# Patient Record
Sex: FEMALE | Race: WHITE | NOT HISPANIC OR LATINO | Employment: FULL TIME | URBAN - METROPOLITAN AREA
[De-identification: names, ages, dates, MRNs, and addresses within clinical notes are randomized per-mention and may not be internally consistent; named-entity substitution may affect disease eponyms.]

---

## 2021-07-19 ENCOUNTER — LAB VISIT (OUTPATIENT)
Dept: LAB | Facility: HOSPITAL | Age: 26
End: 2021-07-19
Payer: MEDICAID

## 2021-07-19 ENCOUNTER — OFFICE VISIT (OUTPATIENT)
Dept: PRIMARY CARE CLINIC | Facility: CLINIC | Age: 26
End: 2021-07-19
Payer: MEDICAID

## 2021-07-19 VITALS
RESPIRATION RATE: 16 BRPM | HEIGHT: 61 IN | WEIGHT: 181.19 LBS | TEMPERATURE: 98 F | SYSTOLIC BLOOD PRESSURE: 122 MMHG | HEART RATE: 99 BPM | DIASTOLIC BLOOD PRESSURE: 74 MMHG | BODY MASS INDEX: 34.21 KG/M2 | OXYGEN SATURATION: 99 %

## 2021-07-19 DIAGNOSIS — Z11.4 SCREENING FOR HIV (HUMAN IMMUNODEFICIENCY VIRUS): ICD-10-CM

## 2021-07-19 DIAGNOSIS — E28.2 PCOS (POLYCYSTIC OVARIAN SYNDROME): Primary | ICD-10-CM

## 2021-07-19 DIAGNOSIS — Z11.59 ENCOUNTER FOR HEPATITIS C SCREENING TEST FOR LOW RISK PATIENT: ICD-10-CM

## 2021-07-19 DIAGNOSIS — Z12.4 SCREENING FOR CERVICAL CANCER: ICD-10-CM

## 2021-07-19 DIAGNOSIS — Z00.00 GENERAL MEDICAL EXAM: ICD-10-CM

## 2021-07-19 DIAGNOSIS — Z13.220 ENCOUNTER FOR LIPID SCREENING FOR CARDIOVASCULAR DISEASE: ICD-10-CM

## 2021-07-19 DIAGNOSIS — Z13.6 ENCOUNTER FOR LIPID SCREENING FOR CARDIOVASCULAR DISEASE: ICD-10-CM

## 2021-07-19 DIAGNOSIS — N92.6 ABNORMAL MENSES: ICD-10-CM

## 2021-07-19 PROCEDURE — 84439 ASSAY OF FREE THYROXINE: CPT | Performed by: NURSE PRACTITIONER

## 2021-07-19 PROCEDURE — 87389 HIV-1 AG W/HIV-1&-2 AB AG IA: CPT | Performed by: NURSE PRACTITIONER

## 2021-07-19 PROCEDURE — 99203 OFFICE O/P NEW LOW 30 MIN: CPT | Mod: S$PBB,,, | Performed by: NURSE PRACTITIONER

## 2021-07-19 PROCEDURE — 80061 LIPID PANEL: CPT | Performed by: NURSE PRACTITIONER

## 2021-07-19 PROCEDURE — 80053 COMPREHEN METABOLIC PANEL: CPT | Performed by: NURSE PRACTITIONER

## 2021-07-19 PROCEDURE — 86803 HEPATITIS C AB TEST: CPT | Performed by: NURSE PRACTITIONER

## 2021-07-19 PROCEDURE — 99203 PR OFFICE/OUTPT VISIT, NEW, LEVL III, 30-44 MIN: ICD-10-PCS | Mod: S$PBB,,, | Performed by: NURSE PRACTITIONER

## 2021-07-19 PROCEDURE — 99204 OFFICE O/P NEW MOD 45 MIN: CPT | Mod: PBBFAC,PN | Performed by: NURSE PRACTITIONER

## 2021-07-19 PROCEDURE — 99999 PR PBB SHADOW E&M-NEW PATIENT-LVL IV: ICD-10-PCS | Mod: PBBFAC,,, | Performed by: NURSE PRACTITIONER

## 2021-07-19 PROCEDURE — 83001 ASSAY OF GONADOTROPIN (FSH): CPT | Performed by: NURSE PRACTITIONER

## 2021-07-19 PROCEDURE — 84481 FREE ASSAY (FT-3): CPT | Performed by: NURSE PRACTITIONER

## 2021-07-19 PROCEDURE — 85025 COMPLETE CBC W/AUTO DIFF WBC: CPT | Performed by: NURSE PRACTITIONER

## 2021-07-19 PROCEDURE — 83002 ASSAY OF GONADOTROPIN (LH): CPT | Performed by: NURSE PRACTITIONER

## 2021-07-19 PROCEDURE — 99999 PR PBB SHADOW E&M-NEW PATIENT-LVL IV: CPT | Mod: PBBFAC,,, | Performed by: NURSE PRACTITIONER

## 2021-07-19 PROCEDURE — 84443 ASSAY THYROID STIM HORMONE: CPT | Performed by: NURSE PRACTITIONER

## 2021-07-19 PROCEDURE — 36415 COLL VENOUS BLD VENIPUNCTURE: CPT | Mod: PN | Performed by: NURSE PRACTITIONER

## 2021-07-19 PROCEDURE — 84146 ASSAY OF PROLACTIN: CPT | Performed by: NURSE PRACTITIONER

## 2021-07-20 ENCOUNTER — TELEPHONE (OUTPATIENT)
Dept: PRIMARY CARE CLINIC | Facility: CLINIC | Age: 26
End: 2021-07-20

## 2021-07-20 LAB
ALBUMIN SERPL BCP-MCNC: 4.2 G/DL (ref 3.5–5.2)
ALP SERPL-CCNC: 63 U/L (ref 55–135)
ALT SERPL W/O P-5'-P-CCNC: 53 U/L (ref 10–44)
ANION GAP SERPL CALC-SCNC: 10 MMOL/L (ref 8–16)
AST SERPL-CCNC: 46 U/L (ref 10–40)
BASOPHILS # BLD AUTO: 0.03 K/UL (ref 0–0.2)
BASOPHILS NFR BLD: 0.3 % (ref 0–1.9)
BILIRUB SERPL-MCNC: 0.5 MG/DL (ref 0.1–1)
BUN SERPL-MCNC: 12 MG/DL (ref 6–20)
CALCIUM SERPL-MCNC: 9.2 MG/DL (ref 8.7–10.5)
CHLORIDE SERPL-SCNC: 104 MMOL/L (ref 95–110)
CHOLEST SERPL-MCNC: 165 MG/DL (ref 120–199)
CHOLEST/HDLC SERPL: 3.2 {RATIO} (ref 2–5)
CO2 SERPL-SCNC: 23 MMOL/L (ref 23–29)
CREAT SERPL-MCNC: 0.9 MG/DL (ref 0.5–1.4)
DIFFERENTIAL METHOD: ABNORMAL
EOSINOPHIL # BLD AUTO: 0.2 K/UL (ref 0–0.5)
EOSINOPHIL NFR BLD: 1.6 % (ref 0–8)
ERYTHROCYTE [DISTWIDTH] IN BLOOD BY AUTOMATED COUNT: 12.6 % (ref 11.5–14.5)
EST. GFR  (AFRICAN AMERICAN): >60 ML/MIN/1.73 M^2
EST. GFR  (NON AFRICAN AMERICAN): >60 ML/MIN/1.73 M^2
FSH SERPL-ACNC: 4.7 MIU/ML
GLUCOSE SERPL-MCNC: 94 MG/DL (ref 70–110)
HCT VFR BLD AUTO: 44.9 % (ref 37–48.5)
HCV AB SERPL QL IA: NEGATIVE
HDLC SERPL-MCNC: 52 MG/DL (ref 40–75)
HDLC SERPL: 31.5 % (ref 20–50)
HGB BLD-MCNC: 14.5 G/DL (ref 12–16)
HIV 1+2 AB+HIV1 P24 AG SERPL QL IA: NEGATIVE
IMM GRANULOCYTES # BLD AUTO: 0.03 K/UL (ref 0–0.04)
IMM GRANULOCYTES NFR BLD AUTO: 0.3 % (ref 0–0.5)
LDLC SERPL CALC-MCNC: 98.4 MG/DL (ref 63–159)
LH SERPL-ACNC: 11.2 MIU/ML
LYMPHOCYTES # BLD AUTO: 1.2 K/UL (ref 1–4.8)
LYMPHOCYTES NFR BLD: 12.5 % (ref 18–48)
MCH RBC QN AUTO: 29.7 PG (ref 27–31)
MCHC RBC AUTO-ENTMCNC: 32.3 G/DL (ref 32–36)
MCV RBC AUTO: 92 FL (ref 82–98)
MONOCYTES # BLD AUTO: 0.4 K/UL (ref 0.3–1)
MONOCYTES NFR BLD: 4.5 % (ref 4–15)
NEUTROPHILS # BLD AUTO: 8 K/UL (ref 1.8–7.7)
NEUTROPHILS NFR BLD: 80.8 % (ref 38–73)
NONHDLC SERPL-MCNC: 113 MG/DL
NRBC BLD-RTO: 0 /100 WBC
PLATELET # BLD AUTO: 267 K/UL (ref 150–450)
PMV BLD AUTO: 11.6 FL (ref 9.2–12.9)
POTASSIUM SERPL-SCNC: 4.1 MMOL/L (ref 3.5–5.1)
PROLACTIN SERPL IA-MCNC: 8 NG/ML (ref 5.2–26.5)
PROT SERPL-MCNC: 7.7 G/DL (ref 6–8.4)
RBC # BLD AUTO: 4.89 M/UL (ref 4–5.4)
SODIUM SERPL-SCNC: 137 MMOL/L (ref 136–145)
T3FREE SERPL-MCNC: 3.1 PG/ML (ref 2.3–4.2)
T4 FREE SERPL-MCNC: 0.96 NG/DL (ref 0.71–1.51)
TRIGL SERPL-MCNC: 73 MG/DL (ref 30–150)
TSH SERPL DL<=0.005 MIU/L-ACNC: 1.11 UIU/ML (ref 0.4–4)
WBC # BLD AUTO: 9.87 K/UL (ref 3.9–12.7)

## 2021-12-02 ENCOUNTER — TELEPHONE (OUTPATIENT)
Dept: OBSTETRICS AND GYNECOLOGY | Facility: CLINIC | Age: 26
End: 2021-12-02
Payer: MEDICAID

## 2021-12-13 ENCOUNTER — OFFICE VISIT (OUTPATIENT)
Dept: OBSTETRICS AND GYNECOLOGY | Facility: CLINIC | Age: 26
End: 2021-12-13
Payer: MEDICAID

## 2021-12-13 VITALS
SYSTOLIC BLOOD PRESSURE: 124 MMHG | WEIGHT: 184.31 LBS | DIASTOLIC BLOOD PRESSURE: 66 MMHG | BODY MASS INDEX: 34.82 KG/M2

## 2021-12-13 DIAGNOSIS — Z87.42 HISTORY OF PCOS: Primary | ICD-10-CM

## 2021-12-13 DIAGNOSIS — E88.819 INSULIN RESISTANCE: ICD-10-CM

## 2021-12-13 PROCEDURE — 99999 PR PBB SHADOW E&M-EST. PATIENT-LVL III: CPT | Mod: PBBFAC,,, | Performed by: NURSE PRACTITIONER

## 2021-12-13 PROCEDURE — 99213 OFFICE O/P EST LOW 20 MIN: CPT | Mod: PBBFAC | Performed by: NURSE PRACTITIONER

## 2021-12-13 PROCEDURE — 99999 PR PBB SHADOW E&M-EST. PATIENT-LVL III: ICD-10-PCS | Mod: PBBFAC,,, | Performed by: NURSE PRACTITIONER

## 2021-12-13 PROCEDURE — 99204 PR OFFICE/OUTPT VISIT, NEW, LEVL IV, 45-59 MIN: ICD-10-PCS | Mod: S$PBB,,, | Performed by: NURSE PRACTITIONER

## 2021-12-13 PROCEDURE — 99204 OFFICE O/P NEW MOD 45 MIN: CPT | Mod: S$PBB,,, | Performed by: NURSE PRACTITIONER

## 2021-12-13 RX ORDER — METFORMIN HYDROCHLORIDE 500 MG/1
500 TABLET, EXTENDED RELEASE ORAL
COMMUNITY
Start: 2021-11-08 | End: 2024-01-03

## 2021-12-13 RX ORDER — METHYLPHENIDATE HYDROCHLORIDE 27 MG/1
TABLET ORAL
COMMUNITY
End: 2024-01-03

## 2021-12-20 ENCOUNTER — LAB VISIT (OUTPATIENT)
Dept: LAB | Facility: HOSPITAL | Age: 26
End: 2021-12-20
Attending: NURSE PRACTITIONER
Payer: MEDICAID

## 2021-12-20 DIAGNOSIS — E88.819 INSULIN RESISTANCE: ICD-10-CM

## 2021-12-20 PROCEDURE — 83036 HEMOGLOBIN GLYCOSYLATED A1C: CPT | Performed by: NURSE PRACTITIONER

## 2021-12-20 PROCEDURE — 83525 ASSAY OF INSULIN: CPT | Performed by: NURSE PRACTITIONER

## 2021-12-20 PROCEDURE — 36415 COLL VENOUS BLD VENIPUNCTURE: CPT | Performed by: NURSE PRACTITIONER

## 2021-12-21 LAB
ESTIMATED AVG GLUCOSE: 100 MG/DL (ref 68–131)
HBA1C MFR BLD: 5.1 % (ref 4–5.6)
INSULIN COLLECTION INTERVAL: NORMAL
INSULIN SERPL-ACNC: 5.2 UU/ML

## 2022-02-07 ENCOUNTER — TELEPHONE (OUTPATIENT)
Dept: SPORTS MEDICINE | Facility: CLINIC | Age: 27
End: 2022-02-07
Payer: MEDICAID

## 2022-03-14 ENCOUNTER — OFFICE VISIT (OUTPATIENT)
Dept: SPORTS MEDICINE | Facility: CLINIC | Age: 27
End: 2022-03-14
Payer: MEDICAID

## 2022-03-14 ENCOUNTER — HOSPITAL ENCOUNTER (OUTPATIENT)
Dept: RADIOLOGY | Facility: HOSPITAL | Age: 27
Discharge: HOME | End: 2022-03-14
Attending: STUDENT IN AN ORGANIZED HEALTH CARE EDUCATION/TRAINING PROGRAM
Payer: MEDICAID

## 2022-03-14 VITALS — WEIGHT: 177.5 LBS | BODY MASS INDEX: 32.66 KG/M2 | HEIGHT: 62 IN | RESPIRATION RATE: 20 BRPM

## 2022-03-14 DIAGNOSIS — M79.641 RIGHT HAND PAIN: Primary | ICD-10-CM

## 2022-03-14 DIAGNOSIS — G56.21 CUBITAL TUNNEL SYNDROME ON RIGHT: ICD-10-CM

## 2022-03-14 DIAGNOSIS — S62.356S CLOSED NONDISPLACED FRACTURE OF SHAFT OF FIFTH METACARPAL BONE OF RIGHT HAND, SEQUELA: Primary | ICD-10-CM

## 2022-03-14 DIAGNOSIS — M79.641 RIGHT HAND PAIN: ICD-10-CM

## 2022-03-14 PROCEDURE — 73130 X-RAY EXAM OF HAND: CPT | Mod: TC,RT

## 2022-03-14 PROCEDURE — 99203 OFFICE O/P NEW LOW 30 MIN: CPT | Mod: S$PBB,,, | Performed by: STUDENT IN AN ORGANIZED HEALTH CARE EDUCATION/TRAINING PROGRAM

## 2022-03-14 PROCEDURE — 3008F BODY MASS INDEX DOCD: CPT | Mod: CPTII,,, | Performed by: STUDENT IN AN ORGANIZED HEALTH CARE EDUCATION/TRAINING PROGRAM

## 2022-03-14 PROCEDURE — 3008F PR BODY MASS INDEX (BMI) DOCUMENTED: ICD-10-PCS | Mod: CPTII,,, | Performed by: STUDENT IN AN ORGANIZED HEALTH CARE EDUCATION/TRAINING PROGRAM

## 2022-03-14 PROCEDURE — 73130 X-RAY EXAM OF HAND: CPT | Mod: 26,RT,, | Performed by: RADIOLOGY

## 2022-03-14 PROCEDURE — 99213 OFFICE O/P EST LOW 20 MIN: CPT | Mod: PBBFAC | Performed by: STUDENT IN AN ORGANIZED HEALTH CARE EDUCATION/TRAINING PROGRAM

## 2022-03-14 PROCEDURE — 99203 PR OFFICE/OUTPT VISIT, NEW, LEVL III, 30-44 MIN: ICD-10-PCS | Mod: S$PBB,,, | Performed by: STUDENT IN AN ORGANIZED HEALTH CARE EDUCATION/TRAINING PROGRAM

## 2022-03-14 PROCEDURE — 1159F PR MEDICATION LIST DOCUMENTED IN MEDICAL RECORD: ICD-10-PCS | Mod: CPTII,,, | Performed by: STUDENT IN AN ORGANIZED HEALTH CARE EDUCATION/TRAINING PROGRAM

## 2022-03-14 PROCEDURE — 73130 XR HAND COMPLETE 3 VIEW RIGHT: ICD-10-PCS | Mod: 26,RT,, | Performed by: RADIOLOGY

## 2022-03-14 PROCEDURE — 1159F MED LIST DOCD IN RCRD: CPT | Mod: CPTII,,, | Performed by: STUDENT IN AN ORGANIZED HEALTH CARE EDUCATION/TRAINING PROGRAM

## 2022-03-14 PROCEDURE — 99999 PR PBB SHADOW E&M-EST. PATIENT-LVL III: CPT | Mod: PBBFAC,,, | Performed by: STUDENT IN AN ORGANIZED HEALTH CARE EDUCATION/TRAINING PROGRAM

## 2022-03-14 PROCEDURE — 99999 PR PBB SHADOW E&M-EST. PATIENT-LVL III: ICD-10-PCS | Mod: PBBFAC,,, | Performed by: STUDENT IN AN ORGANIZED HEALTH CARE EDUCATION/TRAINING PROGRAM

## 2022-03-14 NOTE — PROGRESS NOTES
Patient ID: Maninder Schneider  YOB: 1995  MRN: [id number]    Chief Complaint: No chief complaint on file.      Referred By: *** for ***    History of Present Illness: Maninder Schneider is a {left/right:181258}-hand dominant 27 y.o. female who presents today with ***.     The patient is active in {sports:10829}.  Occupation: ***    ***    Past Medical History:   No past medical history on file.  No past surgical history on file.  Family History   Problem Relation Age of Onset    No Known Problems Mother     No Known Problems Father      Social History     Socioeconomic History    Marital status: Single   Tobacco Use    Smoking status: Former Smoker    Smokeless tobacco: Current User    Tobacco comment: Vape   Substance and Sexual Activity    Alcohol use: Never    Drug use: Never    Sexual activity: Yes     Partners: Male     Medication List with Changes/Refills   Current Medications    METFORMIN (GLUCOPHAGE-XR) 500 MG ER 24HR TABLET    Take 500 mg by mouth.    METHYLPHENIDATE HCL 27 MG CR TABLET    Concerta Take No date recorded No form recorded No frequency recorded No route recorded No set duration recorded No set duration amount recorded active No dosage strength recorded No dosage strength units of measure recorded     Review of patient's allergies indicates:   Allergen Reactions    Tetanus vaccines and toxoid Rash and Swelling       Physical Exam:   There is no height or weight on file to calculate BMI.    GENERAL: Well appearing, in no acute distress.  HEAD: Normocephalic and atraumatic.  ENT: External ears and nose grossly normal.  EYES: EOMI bilaterally  PULMONARY: Respirations are grossly even and non-labored.  NEURO: Awake, alert, and oriented x 3.  SKIN: No obvious rashes appreciated.  PSYCH: Mood & affect are appropriate.    Detailed MSK exam:     ***    Imaging:    ***    Relevant imaging results were reviewed and interpreted by me and per my read ***.  This was discussed with the  patient and / or family today.     Assessment:  Maninder Schneider is a 27 y.o. female ***    Right hand pain  -     X-Ray Hand Complete Right; Future; Expected date: 03/14/2022         A copy of today's visit note has been sent to the referring provider.       Maximo Mcallister MD    Disclaimer: This note was prepared using a voice recognition system and is likely to have sound alike errors within the text.

## 2022-03-14 NOTE — PROGRESS NOTES
Patient ID: Maninder Schneider  YOB: 1995  MRN: [id number]    Chief Complaint: Pain, Injury, and Numbness of the Right Hand    Referred By: Self  for Right Hand Pain     History of Present Illness: Maninder Schneider is a right-hand dominant 27 y.o. female who presents today with 0/10 pain c/o Right Hand Pain .     The patient is active in none.  Occupation:      19-year-old female presenting today for persistent ulnar-sided right hand pain.  She had a injury when she was 15 years old about 12 years ago where she had a shaft fracture to the 5th metacarpal.  She was treated conservatively and immobilization for short time and recently had an injury playing with the kids were finger been always backwards.  She has had before this recent injury still pain and numbness and tingling and feeling of an itch in the webspace between her 4th and 5th digit.  She also notices that her distal phalanx will rub and feels like she has a little bit of rotation to her pinky finger at times when opening and closing making a fist.  She denies any constant numbness and tingling.  She occasionally has pain at the elbow as she has been told is lateral epicondylitis.  Sometimes pain at the wrist especially over the old fracture site mostly on the palmar side.    Past Medical History:   History reviewed. No pertinent past medical history.  History reviewed. No pertinent surgical history.  Family History   Problem Relation Age of Onset    No Known Problems Mother     No Known Problems Father      Social History     Socioeconomic History    Marital status: Single   Tobacco Use    Smoking status: Former Smoker    Smokeless tobacco: Current User    Tobacco comment: Vape   Substance and Sexual Activity    Alcohol use: Never    Drug use: Never    Sexual activity: Yes     Partners: Male     Medication List with Changes/Refills   Current Medications    METFORMIN (GLUCOPHAGE-XR) 500 MG ER 24HR TABLET    Take 500 mg by  mouth.    METHYLPHENIDATE HCL 27 MG CR TABLET    Concerta Take No date recorded No form recorded No frequency recorded No route recorded No set duration recorded No set duration amount recorded active No dosage strength recorded No dosage strength units of measure recorded     Review of patient's allergies indicates:   Allergen Reactions    Tetanus vaccines and toxoid Rash and Swelling       Physical Exam:   Body mass index is 32.99 kg/m².    GENERAL: Well appearing, in no acute distress.  HEAD: Normocephalic and atraumatic.  ENT: External ears and nose grossly normal.  EYES: EOMI bilaterally  PULMONARY: Respirations are grossly even and non-labored.  NEURO: Awake, alert, and oriented x 3.  SKIN: No obvious rashes appreciated.  PSYCH: Mood & affect are appropriate.    Detailed MSK exam:     Right hand exam  Subtle rotation appreciated pinky finger with opening closing right hand.  Motor function median ulnar radial nerve all intact sensation intact throughout tenderness on the palmar side of the shaft of the 5th metacarpal loss of MCP on the pinky finger of the right hand as well.  Mild tenderness over lateral epicondyle the elbow mild tenderness over the medial epicondyle E elbow equivocal Tinel's in the cubital tunnel negative Tinel's at Guyon's canal    Imaging:    Narrative & Impression  EXAMINATION:  XR HAND COMPLETE 3 VIEW RIGHT     CLINICAL HISTORY:  Pain in right hand     TECHNIQUE:  PA, lateral, and oblique views of the right hand were performed.     COMPARISON:  None     FINDINGS:  There is bowing of the 5th metacarpal without discrete fracture line.  Remaining osseous structures intact.  Osseous structures unremarkable.     Impression:     As above        Electronically signed by: Ricco Samuels MD  Date:                                            03/14/2022  Time:                                           16:07    Relevant imaging results were reviewed and interpreted by me and per my read as above.   This was discussed with the patient and / or family today.     Assessment:  Maninder Schneider is a 27 y.o. female presents today for persistent pain following a slightly rotated in now healed both shaft fracture to the 5th metacarpal right hand.  She is 12 years out from his initial injury.  Her biggest complaint however are feeling of it shin numbness and tingling at times on the ulnar side of her hand.  I would like to rule out cubital tunnel or impingement at Guyon's canal but peripheral nerve her digital nerve injury is also included my differential.  Formal EMG to evaluate follow-up with me after EMG.      Closed nondisplaced fracture of shaft of fifth metacarpal bone of right hand, sequela  -     EMG W/ ULTRASOUND AND NERVE CONDUCTION TEST 1 Extremity; Future    Cubital tunnel syndrome on right         A copy of today's visit note has been sent to the referring provider.       Maximo Mcallister MD    Disclaimer: This note was prepared using a voice recognition system and is likely to have sound alike errors within the text.

## 2022-03-14 NOTE — PATIENT INSTRUCTIONS
Assessment:  Maninder Schneider is a 27 y.o. female   Chief Complaint   Patient presents with    Right Hand - Pain, Injury, Numbness       Encounter Diagnosis   Name Primary?    Closed nondisplaced fracture of shaft of fifth metacarpal bone of right hand, sequela Yes        Plan:  EMG to rule out nerve injury to the right hand.     Follow-up: After EMG or sooner if there are any problems between now and then.    Thank you for choosing [facility] and Dr. Maximo Mcallister for your orthopedic & sports medicine care. It is our goal to provide you with exceptional care that will help keep you healthy, active, and get you back in the game.    Please do not hesitate to reach out to us via email, phone, or MyChart with any questions, concerns, or feedback.    If you felt that you received exemplary care today, please consider leaving us feedback on [employer] at:  [email or web address]    If you are experiencing pain/discomfort ,or have questions after 5pm and would like to be connected to the [facility] on-call team, please call this number and specify which Sports Medicine provider is treating you: [phone]

## 2022-04-11 ENCOUNTER — OFFICE VISIT (OUTPATIENT)
Dept: PHYSICAL MEDICINE AND REHAB | Facility: CLINIC | Age: 27
End: 2022-04-11
Payer: MEDICAID

## 2022-04-11 VITALS
WEIGHT: 177 LBS | HEIGHT: 62 IN | RESPIRATION RATE: 14 BRPM | SYSTOLIC BLOOD PRESSURE: 148 MMHG | BODY MASS INDEX: 32.57 KG/M2 | DIASTOLIC BLOOD PRESSURE: 100 MMHG | HEART RATE: 98 BPM

## 2022-04-11 DIAGNOSIS — G56.21 CUBITAL TUNNEL SYNDROME ON RIGHT: Primary | ICD-10-CM

## 2022-04-11 DIAGNOSIS — G56.01 CARPAL TUNNEL SYNDROME OF RIGHT WRIST: ICD-10-CM

## 2022-04-11 PROBLEM — F90.0 ATTENTION DEFICIT HYPERACTIVITY DISORDER (ADHD), PREDOMINANTLY INATTENTIVE TYPE: Status: ACTIVE | Noted: 2022-04-11

## 2022-04-11 PROBLEM — F60.9 PERSONALITY DISORDER: Status: ACTIVE | Noted: 2022-04-11

## 2022-04-11 PROBLEM — E28.2 POLYCYSTIC OVARIAN DISEASE: Status: ACTIVE | Noted: 2022-04-11

## 2022-04-11 PROCEDURE — 99203 OFFICE O/P NEW LOW 30 MIN: CPT | Mod: 25,S$PBB,, | Performed by: PHYSICAL MEDICINE & REHABILITATION

## 2022-04-11 PROCEDURE — 95913 NRV CNDJ TEST 13/> STUDIES: CPT | Mod: 26,S$PBB,, | Performed by: PHYSICAL MEDICINE & REHABILITATION

## 2022-04-11 PROCEDURE — 1159F PR MEDICATION LIST DOCUMENTED IN MEDICAL RECORD: ICD-10-PCS | Mod: CPTII,,, | Performed by: PHYSICAL MEDICINE & REHABILITATION

## 2022-04-11 PROCEDURE — 3008F BODY MASS INDEX DOCD: CPT | Mod: CPTII,,, | Performed by: PHYSICAL MEDICINE & REHABILITATION

## 2022-04-11 PROCEDURE — 99213 OFFICE O/P EST LOW 20 MIN: CPT | Mod: PBBFAC | Performed by: PHYSICAL MEDICINE & REHABILITATION

## 2022-04-11 PROCEDURE — 1160F PR REVIEW ALL MEDS BY PRESCRIBER/CLIN PHARMACIST DOCUMENTED: ICD-10-PCS | Mod: CPTII,,, | Performed by: PHYSICAL MEDICINE & REHABILITATION

## 2022-04-11 PROCEDURE — 99999 PR PBB SHADOW E&M-EST. PATIENT-LVL III: ICD-10-PCS | Mod: PBBFAC,,, | Performed by: PHYSICAL MEDICINE & REHABILITATION

## 2022-04-11 PROCEDURE — 1159F MED LIST DOCD IN RCRD: CPT | Mod: CPTII,,, | Performed by: PHYSICAL MEDICINE & REHABILITATION

## 2022-04-11 PROCEDURE — 95913 PR NERVE CONDUCTION STUDY; 13 OR MORE STUDIES: ICD-10-PCS | Mod: 26,S$PBB,, | Performed by: PHYSICAL MEDICINE & REHABILITATION

## 2022-04-11 PROCEDURE — 99203 PR OFFICE/OUTPT VISIT, NEW, LEVL III, 30-44 MIN: ICD-10-PCS | Mod: 25,S$PBB,, | Performed by: PHYSICAL MEDICINE & REHABILITATION

## 2022-04-11 PROCEDURE — 1160F RVW MEDS BY RX/DR IN RCRD: CPT | Mod: CPTII,,, | Performed by: PHYSICAL MEDICINE & REHABILITATION

## 2022-04-11 PROCEDURE — 95913 NRV CNDJ TEST 13/> STUDIES: CPT | Mod: PBBFAC | Performed by: PHYSICAL MEDICINE & REHABILITATION

## 2022-04-11 PROCEDURE — 3077F PR MOST RECENT SYSTOLIC BLOOD PRESSURE >= 140 MM HG: ICD-10-PCS | Mod: CPTII,,, | Performed by: PHYSICAL MEDICINE & REHABILITATION

## 2022-04-11 PROCEDURE — 3008F PR BODY MASS INDEX (BMI) DOCUMENTED: ICD-10-PCS | Mod: CPTII,,, | Performed by: PHYSICAL MEDICINE & REHABILITATION

## 2022-04-11 PROCEDURE — 3080F PR MOST RECENT DIASTOLIC BLOOD PRESSURE >= 90 MM HG: ICD-10-PCS | Mod: CPTII,,, | Performed by: PHYSICAL MEDICINE & REHABILITATION

## 2022-04-11 PROCEDURE — 3077F SYST BP >= 140 MM HG: CPT | Mod: CPTII,,, | Performed by: PHYSICAL MEDICINE & REHABILITATION

## 2022-04-11 PROCEDURE — 99999 PR PBB SHADOW E&M-EST. PATIENT-LVL III: CPT | Mod: PBBFAC,,, | Performed by: PHYSICAL MEDICINE & REHABILITATION

## 2022-04-11 PROCEDURE — 3080F DIAST BP >= 90 MM HG: CPT | Mod: CPTII,,, | Performed by: PHYSICAL MEDICINE & REHABILITATION

## 2022-04-11 RX ORDER — PREDNISONE 20 MG/1
20 TABLET ORAL 2 TIMES DAILY
COMMUNITY
Start: 2022-02-07 | End: 2024-01-03

## 2022-04-11 RX ORDER — NABUMETONE 500 MG/1
500 TABLET, FILM COATED ORAL 2 TIMES DAILY PRN
COMMUNITY
Start: 2022-01-31 | End: 2024-01-03

## 2022-04-11 NOTE — PROGRESS NOTES
[facility]   [address]  [place] [address]  Phone: [phone]        Full Name: Maninder Schneider YOB: 1995  Patient ID: [id number]      Visit Date: 4/11/2022 12:24  Age: 27 Years 3 Months Old  Examining Physician: Amparo Evans M.D.  Referring Physician: Dr Mcallister  Reason for Referral: upper ext      Chief Complaint   Patient presents with    Hand Pain       HPI: This is a 27 y.o.  female being seen in clinic today for evaluation of chronic [id number] digit numbness,tingling and abnormal sensations.  With increased hand usage her symptoms can worsen with weakness and shaking at times.  She has a history of 5th metacarpal shaft fracture years ago.  Resting or rubbing her hand/finger provides minimal relief.     History obtained from patient    Past family, medical, social, and surgical history reviewed in chart    Review of Systems:     General- denies lethargy, weight change, fever, chills  Head/neck- denies swallowing difficulties  ENT- denies hearing changes  Cardiovascular-denies chest pain  Pulmonary- denies shortness of breath  GI- denies constipation or bowel incontinence  - denies bladder incontinence  Skin- denies wounds or rashes  Musculoskeletal- denies weakness, +pain  Neurologic- +numbness and tingling  Psychiatric- denies depressive or psychotic features, denies anxiety  Lymphatic-denies swelling  Endocrine- denies hypoglycemic symptoms/DM history  All other pertinent systems negative     Physical Examination:  General: Well developed, well nourished female, NAD  HEENT:NCAT EOMI bilaterally   Pulmonary:Normal respirations    Spinal Examination: CERVICAL  Active ROM is within normal limits.  Inspection: No deformity of spinal alignment.  Palpation: No vertebral tenderness to percussion.      Musculoskeletal Tests:  Phalen: neg  Elbow compression (ulnar): mild+_on right  Tinels at wrist: neg    Bilateral Upper and Lower Extremities:  Pulses are 2+ at radial  bilaterally.  Shoulder/Elbow/Wrist/Hand ROM wnl  Hip/Knee/Ankle ROM   Bilateral Extremities show normal capillary refill.  No signs of cyanosis, rubor, edema, skin changes, or dysvascular changes of appendages.  Nails appear intact.    Neurological Exam:  Cranial Nerves:  II-XII grossly intact    Manual Muscle Testing: (Motor 5=normal)  5/5 strength bilateral upper extremities    No focal atrophy is noted of either upper extremity.    Bilateral Reflexes:  Scanlon's response is absent bilaterally.    Sensation: tested to light touch  - intact in arms except dec at right 5th digit     Gait: Narrow base and good arm swing.      Entire procedure explained to patient prior to proceeding.  Verbal consent obtained        SNC      Nerve / Sites Rec. Site Onset Lat Peak Lat Amp Segments Distance Velocity     ms ms µV  mm m/s   R Median - Digit II (Antidromic)      Wrist Dig II 2.6 3.5 10.7 Wrist - Dig  54   L Median - Digit II (Antidromic)      Wrist Dig II 2.9 3.6 15.3 Wrist - Dig  49   R Ulnar - Digit V (Antidromic)      Wrist Dig V 2.7 3.3 18.3 Wrist - Dig V 140 52   L Ulnar - Digit V (Antidromic)      Wrist Dig V 2.4 3.3 20.3 Wrist - Dig V 140 58   R Radial - Anatomical snuff box (Forearm)      Forearm Wrist 1.5 2.2 22.6 Forearm - Wrist 100 66   L Radial - Anatomical snuff box (Forearm)      Forearm Wrist 1.8 2.4 15.3 Forearm - Wrist 100 56   R Dorsal ulnar cutaneous - Hand dorsum (Forearm)      1 Hand dorsum 1.9 2.6 12.9 1 - Hand dorsum     L Dorsal ulnar cutaneous - Hand dorsum (Forearm)      1 Hand dorsum 1.9 2.4 17.3 1 - Hand dorsum 100 53       CSI      Nerve / Sites Rec. Site Peak Lat NP Amp Segments Peak Diff     ms µV  ms   R Median - CSI      Median Thumb 2.9 6.4 Median - Radial 0.5      Radial Thumb 2.4 18.6 Median - Ulnar 0.2      Median Ring 3.8 26.3 Median palm - Ulnar palm 0.3      Ulnar Ring 3.5 29.5        Median palm Wrist 2.1 22.3        Ulnar palm Wrist 1.8 58.8        CSI    CSI 1.0   L  Median - CSI      Median Thumb 2.9 20.5 Median - Radial 0.3      Radial Thumb 2.7 18.9 Median - Ulnar 0.3      Median Ring 3.6 27.2 Median palm - Ulnar palm 0.3      Ulnar Ring 3.3 40.1        Median palm Wrist 2.0 41.1        Ulnar palm Wrist 1.8 55.6        CSI    CSI 0.8       MNC      Nerve / Sites Muscle Latency Amplitude Duration Rel Amp Segments Distance Lat Diff Velocity     ms mV ms %  mm ms m/s   R Median - APB      Wrist APB 3.0 16.8 6.1 100 Wrist - APB 80        Elbow APB 6.4 16.4 6.2 97.4 Elbow - Wrist 190 3.4 56   L Median - APB      Wrist APB 3.0 14.5 6.0 100 Wrist - APB 80        Elbow APB 6.3 14.5 6.1 99.5 Elbow - Wrist 170 3.2 53   R Ulnar - ADM      Wrist ADM 3.1 9.1 7.1 100 Wrist - ADM 80        B.Elbow ADM 5.7 8.5 7.7 93.4 B.Elbow - Wrist 200 2.6 77      A.Elbow ADM 7.7 7.3 7.7 85.8 A.Elbow - B.Elbow 120 2.0 61         A.Elbow - Wrist  4.6    L Ulnar - ADM      Wrist ADM 3.0 9.8 6.2 100 Wrist - ADM 80        B.Elbow ADM 5.8 9.2 6.6 94.1 B.Elbow - Wrist 190 2.8 68      A.Elbow ADM 7.4 8.4 6.9 91.2 A.Elbow - B.Elbow 100 1.6 62         A.Elbow - Wrist  4.4                                               INTERPRETATION  -Bilateral median motor nerve conduction study showed normal latency, amplitude, and conduction velocity  -Bilateral median sensory nerve conduction study showed normal peak latency and amplitude  -Bilateral ulnar motor nerve conduction study showed normal latency, amplitude, and dec conduction velocity across the right elbow  -Bilateral ulnar sensory nerve conduction study showed normal peak latency and amplitude  -Bilateral radial sensory nerve conduction study showed normal peak latency and amplitude  -Bilateral dorsal ulnar cutaneous sensory nerve conductions study showed normal peak latency and amplitude  -right combined sensory index was significant, left was non significant but close    IMPRESSION  1. ABNORMAL study  2. 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 Follow up with referring provider: Dr. Maximo Mcallister  2. Handouts on CTS and cubital tunnel syndrome provided  3. This study is good for one year. If symptoms worsen or do not improve, please re-consult.    Amparo Evans M.D.  Physical Medicine and Rehab

## 2022-04-25 ENCOUNTER — OFFICE VISIT (OUTPATIENT)
Dept: SPORTS MEDICINE | Facility: CLINIC | Age: 27
End: 2022-04-25
Payer: MEDICAID

## 2022-04-25 VITALS — RESPIRATION RATE: 20 BRPM | HEIGHT: 62 IN | WEIGHT: 178.81 LBS | BODY MASS INDEX: 32.91 KG/M2

## 2022-04-25 DIAGNOSIS — S62.356S CLOSED NONDISPLACED FRACTURE OF SHAFT OF FIFTH METACARPAL BONE OF RIGHT HAND, SEQUELA: ICD-10-CM

## 2022-04-25 DIAGNOSIS — G56.21 CUBITAL TUNNEL SYNDROME ON RIGHT: Primary | ICD-10-CM

## 2022-04-25 PROCEDURE — 99214 PR OFFICE/OUTPT VISIT, EST, LEVL IV, 30-39 MIN: ICD-10-PCS | Mod: S$PBB,,, | Performed by: STUDENT IN AN ORGANIZED HEALTH CARE EDUCATION/TRAINING PROGRAM

## 2022-04-25 PROCEDURE — 3008F PR BODY MASS INDEX (BMI) DOCUMENTED: ICD-10-PCS | Mod: CPTII,,, | Performed by: STUDENT IN AN ORGANIZED HEALTH CARE EDUCATION/TRAINING PROGRAM

## 2022-04-25 PROCEDURE — 3008F BODY MASS INDEX DOCD: CPT | Mod: CPTII,,, | Performed by: STUDENT IN AN ORGANIZED HEALTH CARE EDUCATION/TRAINING PROGRAM

## 2022-04-25 PROCEDURE — 99999 PR PBB SHADOW E&M-EST. PATIENT-LVL III: CPT | Mod: PBBFAC,,, | Performed by: STUDENT IN AN ORGANIZED HEALTH CARE EDUCATION/TRAINING PROGRAM

## 2022-04-25 PROCEDURE — 99213 OFFICE O/P EST LOW 20 MIN: CPT | Mod: PBBFAC | Performed by: STUDENT IN AN ORGANIZED HEALTH CARE EDUCATION/TRAINING PROGRAM

## 2022-04-25 PROCEDURE — 99999 PR PBB SHADOW E&M-EST. PATIENT-LVL III: ICD-10-PCS | Mod: PBBFAC,,, | Performed by: STUDENT IN AN ORGANIZED HEALTH CARE EDUCATION/TRAINING PROGRAM

## 2022-04-25 PROCEDURE — 99214 OFFICE O/P EST MOD 30 MIN: CPT | Mod: S$PBB,,, | Performed by: STUDENT IN AN ORGANIZED HEALTH CARE EDUCATION/TRAINING PROGRAM

## 2022-04-25 RX ORDER — METHYLPHENIDATE HYDROCHLORIDE 54 MG/1
54 TABLET ORAL EVERY MORNING
COMMUNITY
Start: 2022-04-13 | End: 2024-01-03

## 2022-04-25 NOTE — PATIENT INSTRUCTIONS
Assessment:  Maninder Schneider is a 27 y.o. female   Chief Complaint   Patient presents with    Follow-up     Closed nondisplaced fracture of shaft of fifth metacarpal bone of right hand       Encounter Diagnoses   Name Primary?    Cubital tunnel syndrome on right Yes    Closed nondisplaced fracture of shaft of fifth metacarpal bone of right hand, sequela         Plan:  Occupational therapy   Elbow extension brace to be worn at night.     Follow-up: 2 months or sooner if there are any problems between now and then.    Thank you for choosing [facility] and Dr. Maximo Mcallister for your orthopedic & sports medicine care. It is our goal to provide you with exceptional care that will help keep you healthy, active, and get you back in the game.    Please do not hesitate to reach out to us via email, phone, or MyChart with any questions, concerns, or feedback.    If you felt that you received exemplary care today, please consider leaving us feedback on Healthgrades at:  [email or web address]    If you are experiencing pain/discomfort ,or have questions after 5pm and would like to be connected to the [facility] on-call team, please call this number and specify which Sports Medicine provider is treating you: [phone]

## 2022-04-25 NOTE — PROGRESS NOTES
Patient ID: Maninder Schneidre  YOB: 1995  MRN: [id number]    Chief Complaint: Follow-up (Closed nondisplaced fracture of shaft of fifth metacarpal bone of right hand)      History of Present Illness: Maninder Schneider is a right-hand dominant 27 y.o. female who presents today with 0/10 pain c/o Follow-up closed nondisplaced fracture of shaft of fifth metacarpal bone of right hand.     The patient is active in none.  Occupation:      19-year-old female following up today for EMG of right upper extremity.  Notable still having pain with activity mostly at the shaft of the 5th metacarpal.  Denies any numbness and tingling still EMG with mild cubital tunnel and very mild carpal tunnel.  Otherwise most her pain is related to activity no pain at night has not done any formal therapy still has some issues and concerns with possible rotation of her pinky finger with opening closing 5th    Past Medical History:   History reviewed. No pertinent past medical history.  History reviewed. No pertinent surgical history.  Family History   Problem Relation Age of Onset    No Known Problems Mother     No Known Problems Father      Social History     Socioeconomic History    Marital status: Single   Tobacco Use    Smoking status: Former Smoker    Smokeless tobacco: Current User    Tobacco comment: Vape   Substance and Sexual Activity    Alcohol use: Never    Drug use: Never    Sexual activity: Yes     Partners: Male     Medication List with Changes/Refills   Current Medications    METFORMIN (GLUCOPHAGE-XR) 500 MG ER 24HR TABLET    Take 500 mg by mouth.    METHYLPHENIDATE HCL 27 MG CR TABLET    Concerta Take No date recorded No form recorded No frequency recorded No route recorded No set duration recorded No set duration amount recorded active No dosage strength recorded No dosage strength units of measure recorded    METHYLPHENIDATE HCL 54 MG CR TABLET    Take 54 mg by mouth every morning.     NABUMETONE (RELAFEN) 500 MG TABLET    Take 500 mg by mouth 2 (two) times daily as needed.    PREDNISONE (DELTASONE) 20 MG TABLET    Take 20 mg by mouth 2 (two) times daily.     Review of patient's allergies indicates:   Allergen Reactions    Tetanus vaccines and toxoid Rash and Swelling       Physical Exam:   Body mass index is 33.24 kg/m².    GENERAL: Well appearing, in no acute distress.  HEAD: Normocephalic and atraumatic.  ENT: External ears and nose grossly normal.  EYES: EOMI bilaterally  PULMONARY: Respirations are grossly even and non-labored.  NEURO: Awake, alert, and oriented x 3.  SKIN: No obvious rashes appreciated.  PSYCH: Mood & affect are appropriate.    Detailed MSK exam:     Subtle rotation appreciated bowing loss of knuckle at the pinky finger on the right tenderness over the dorsal aspect of the shaft of the 5th metacarpal.  Sensation intact distally    Imaging:    EMG  INTERPRETATION  -Bilateral median motor nerve conduction study showed normal latency, amplitude, and conduction velocity  -Bilateral median sensory nerve conduction study showed normal peak latency and amplitude  -Bilateral ulnar motor nerve conduction study showed normal latency, amplitude, and dec conduction velocity across the right elbow  -Bilateral ulnar sensory nerve conduction study showed normal peak latency and amplitude  -Bilateral radial sensory nerve conduction study showed normal peak latency and amplitude  -Bilateral dorsal ulnar cutaneous sensory nerve conductions study showed normal peak latency and amplitude  -right combined sensory index was significant, left was non significant but close     IMPRESSION  1. ABNORMAL study  2. 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  This was  discussed with the patient and / or family today.     Assessment:  Maninder Schneider is a 27 y.o. female presents today for a history of a 5th metacarpal shaft fracture that resulted in some angulation chronic pain and mild rotational component that is still very bothersome to the patient.  This fracture happened over 10 years ago.  It has become more worrisome EMG with only minor findings of cubital tunnel and does not appear to be the nidus of her pain.  Discussed formal therapy to work on some of the mild rotational concerns that she has today as well as chronic pain if still having issues can refer to hand surgery for further evaluation and possible surgical management.  Follow-up with me 2 months after occupational therapy in [place]    Cubital tunnel syndrome on right  -     Ambulatory referral/consult to Physical/Occupational Therapy; Future; Expected date: 05/02/2022    Closed nondisplaced fracture of shaft of fifth metacarpal bone of right hand, sequela           Maximo Mcallister MD    Disclaimer: This note was prepared using a voice recognition system and is likely to have sound alike errors within the text.

## 2022-05-06 ENCOUNTER — CLINICAL SUPPORT (OUTPATIENT)
Dept: REHABILITATION | Facility: HOSPITAL | Age: 27
End: 2022-05-06
Attending: STUDENT IN AN ORGANIZED HEALTH CARE EDUCATION/TRAINING PROGRAM
Payer: MEDICAID

## 2022-05-06 DIAGNOSIS — R53.1 DECREASED STRENGTH: ICD-10-CM

## 2022-05-06 DIAGNOSIS — R52 PAIN: ICD-10-CM

## 2022-05-06 DIAGNOSIS — G56.21 CUBITAL TUNNEL SYNDROME ON RIGHT: ICD-10-CM

## 2022-05-06 PROCEDURE — 97165 OT EVAL LOW COMPLEX 30 MIN: CPT | Mod: PN

## 2022-05-06 NOTE — PATIENT INSTRUCTIONS
Issued Via [email or web address] (see logo above) scan QR code for pt specific program         View at [email or web address] using code: [email or web address]

## 2022-05-10 PROBLEM — R53.1 DECREASED STRENGTH: Status: ACTIVE | Noted: 2022-05-10

## 2022-05-10 PROBLEM — R52 PAIN: Status: ACTIVE | Noted: 2022-05-10

## 2022-05-10 NOTE — PLAN OF CARE
" [facility]  Occupational Therapy Initial Evaluation    Date: 5/6/2022  Name: Maninder Schneider  Clinic Number: [id number]    Therapy Diagnosis:   Encounter Diagnoses   Name Primary?    Cubital tunnel syndrome on right     Pain     Decreased strength      Physician: Maximo Mcallister MD    Physician Orders: Evaluate and treat   Medical Diagnosis: G56.21 (ICD-10-CM) - Cubital tunnel syndrome on right   Surgical Procedure and Date: N/A , N/A   Evaluation Date: 5/6/2022    Insurance Authorization Period Expiration: 04/25/2022 04/25/2023   Plan of Care Certification Period: 5/10/2022 - 7/29/2022  Progress Note Due: 6/17/2022   Date of Return to MD: 6/27/2022  Visit # / Visits authorized: 1 / 1  FOTO: 1/3   Initial= 45%/ Goal= 35%    Precautions:  Standard    Time In:10:45  Time Out: 11:50  Total Appointment Time (timed & untimed codes): 50 minutes    SUBJECTIVE     Date of Onset:  Patient reports that she has had hand pain for at least 7 years.  She reports having a boxers fracture around the age of 15-17 years old.      History of Current Condition/Mechanism of Injury: Maninder reports: that she has had hand pain for at least 7 years.  She reports having a boxers fracture around the age of 15-17 years old, where she was casted (not splinted) for 3-4 months. She had developed pain years following that, which has worsened over time.  She also reports playing with children where she hyperextended her pinky at the MCP joint more recently, 1-2 months ago. She had purchased a boxers fracture brace with immobilization from the fingers to distal forearm to wear at work and try to reduce pain. She reports feeling as if her fingers rub together when fisting/with hand movement and it "bothers" her. Maninder reports her pain level as 2/10 and a dull ache most of the day; she reports her pain level as 7/10 in the morning, but no stiffness.  Patient complains of difficulty with work tasks such as pouring liquor " "due to "pressure" on her hand and with writing/legibility of writing as her hand pain increases.           Falls: none    Involved Side: right   Dominant Side: Right  Imaging:   xray 3/14/2022   FINDINGS:  There is bowing of the 5th metacarpal without discrete fracture line.  Remaining osseous structures intact.  Osseous structures unremarkable.      4/11/2022 EMG W/ ULTRASOUND AND NERVE CONDUCTION TEST   IMPRESSION  1. ABNORMAL study  2. There is electrodiagnostic evidence of a mild demyelinating ulnar neuropathy (Cubital tunnel syndrome) across the right elbow and a very mild demyelinating CTS across the RIGHT wrist    Prior Therapy: none  Occupation:  manager// at [employer]   Working presently: employed FT   Duties: carrying trays of drinks/food; pouring liqour     Functional Limitations/Social History:    Previous functional status includes: Independent with all ADLs.     Current Functional Status   Home/Living environment: lives with their family (mother)       Limitation of Functional Status as follows:   ADLs/IADLs:     - Feeding: minimal difficulty; using fork/chopstick hurts     - Bathing: independent      - Dressing/Grooming: minimal difficulty with fixing hair    - Driving: minimal difficulty; alternates hands as needed to drive      Leisure: drawing/painting/writing    Pain:  Functional Pain Scale Rating 0-10: Current 6/10, worst 9/10, best 2/10   Location: volar surface of palm over right metacarpal/hypothenar area   Description: Dull, Sharp and Shooting  Aggravating Factors: holding items such as her phone while gripping with pinky and anything that pressure or requires pushing through palms   Easing Factors: try to hold it still and straight    Patient's Goals for Therapy: "To not have pain anymore and my finger to be straight."     Medical History:   No past medical history on file.    Surgical History:    has no past surgical history on file.    Medications:   has a current " "medication list which includes the following prescription(s): metformin, methylphenidate hcl, methylphenidate hcl, nabumetone, and prednisone.    Allergies:   Review of patient's allergies indicates:   Allergen Reactions    Tetanus vaccines and toxoid Rash and Swelling          OBJECTIVE   Observation/Appearance:  Skin intact and Deformities noted: 5th digit MCP joint subluxation anteriorly at rest with rotation/radial drift of SF with gripping/finger movement, but rotation most notable with relaxation following active movement      Edema. Measured in centimeters.  No significant edema noted throughout right hand/wrist.     Hand ROM. Measured in degrees.   5/10/2022   5/10/2022      Left Right        Thumb:             Opposition WNL  WFL +        Fisting:     Composite  WNL  WNL    Hook  WNL  WNL    Flat  WNL  WNL      Opposition- patient reports feeling pull over SF MCP joint     All fisting WFL, but following observations noted:   -SF MCP joint subluxation anteriorly/volarly at rest; increases with opposition with metacarpal head becoming more prominent at palmar surface of hand       Active Range of Motion: Measured in degrees    Wrist Left Right     AROM AROM    Flexion WNL   WNL    Extension " "   Radial Deviation " "   Ulnar Deviation " "   Supination " "   Pronation " "          Elbow Left  Right   Flexion WNL   WNL     Extension WNL  WNL          Sensation  Patient denies numbness & tingling at this time. Light touch, temperature, sharp and dull are grossly intact in right forearm/wrist/hand      Special Tests:   Right   5/6/2022   Tinel's Test-elbow Negative    Tinel's Test- wrist  Negative    Froment's Sign Negative    Iris's Sign  Negative    Elbow compression test  Positive for N/T middle and ring finger (no SF N/T)        Manual Muscle Testing    Action Left  Action  Right    Elbow Extension 4+/5 Elbow Extension 4+/5   Elbow Flexion 5/5 Elbow Flexion 5/5   Forearm Supination 5/5 Forearm Supination " 5/5   Forearm Pronation 5/5 Forearm Pronation 5/5   Wrist Extension  5/5 Wrist Extension  5/5   Wrist Flexion 5/5 Wrist Flexion 5/5   Wrist Radial Deviation 5/5 Wrist Radial Deviation 5/5   Wrist Ulnar Deviation 5/5 Wrist Ulnar Deviation 5/5     Manual Muscle Testing    Action Right   SF Flexion FDS/FDP 3+/5   Opposition to tip of SF   3+/5   SF abduction/adduction 3+/5        Strength: (FARNKLIN Dynamometer in lbs.) Average 3 trials, Position II:     5/6/2022 5/6/2022    Left Right   Elbow Flexed 64# 53#   Elbow Extended NT  NT        Palpation: Patient reports tenderness with palpation at:  -Dorsal SF MCP tenderness  -Volar palm over hypothenar eminence along 5th metacarpal         Limitation/Restriction for FOTO hand Survey    Therapist reviewed FOTO scores for Maninder Schneider on 5/6/2022.   FOTO documents entered into [employer] - see Media section.    Limitation Score: 45%         Treatment   Total Treatment time (time-based codes) separate from Evaluation: 10 minutes    Maninder received the treatments listed below:     Supervised modalities after being cleared for contradictions:   Hot Pack - Patient received MH x 10 min to right hand to increase blood flow, circulation and decrease pain      Therapeutic exercises to develop strength, endurance and ROM for 5 minutes, including:  Review of issued HEP       Patient Education and Home Exercises      Education provided:   - HEP issued     Written Home Exercises Provided: yes.  Exercises were reviewed and Maninder was able to demonstrate them prior to the end of the session.  Maninder demonstrated good  understanding of the education provided. See EMR under Patient Instructions for exercises provided during therapy sessions.     Pt was advised to perform these exercises free of pain, and to stop performing them if pain occurs.    Patient/Family Education: role of OT, goals for OT, scheduling/cancellations - pt verbalized understanding. Discussed insurance limitations with  patient.      ASSESSMENT     Maninder Schneider is a 27 y.o. female referred to outpatient occupational therapy and presents with a medical diagnosis of G56.21 (ICD-10-CM) - Cubital tunnel syndrome on right.  Patient presents with the following therapy deficits: Decreased  strength, Decreased muscle strength, Decreased functional hand use and Increased pain and demonstrates limitations as described in the chart below. Following medical record review it is determined that pt will benefit from occupational therapy services in order to maximize pain free and/or functional use of right hand. The following goals were discussed with the patient and patient is in agreement with them as to be addressed in the treatment plan. The patient's rehab potential is Fair.     Anticipated barriers to occupational therapy: compliance with HEP, therapy attendance   Pt has no cultural, educational or language barriers to learning provided.    Profile and History Assessment of Occupational Performance Level of Clinical Decision Making Complexity Score   Occupational Profile:   Maninder Schneider is a 27 y.o. female who lives with their family and is currently employed Maninder Schneider has difficulty with  ADLs and IADLs as listed previously, which  Affecting herdaily functional abilities.      Comorbidities:    has no past medical history on file.    Medical and Therapy History Review:   Brief               Performance Deficits    Physical:  Joint Mobility  Joint Stability  Muscle Power/Strength  Muscle Endurance   Strength  Pain    Cognitive:  No Deficits    Psychosocial:    No Deficits     Clinical Decision Making:  low    Assessment Process:  Problem-Focused Assessments    Modification/Need for Assistance:  Not Necessary    Intervention Selection:  Several Treatment Options       low  Based on PMHX, co morbidities , data from assessments and functional level of assistance required with task and clinical presentation directly impacting  function.       The following goals were discussed with the patient and patient is in agreement with them as to be addressed in the treatment plan.     Goals:   Short Term Goals: (in 6 weeks)  1) Patient will be independent in HEP  2) Decrease pain in right hand to no more than 3-4/10 worst for increased functioning in ADL/IADL's  3) Increase strength in SF right hand by 1/2 muscle grade for increased functioning in ADL/IADL's  4) Increase right hand  strength by 10% or original measures for increased functional use with ADLs/IADLs  5) Patient will tolerate light resistive gripping with right hand for 1 minute with rest breaks as needed for increased functional use with ADLs/IADLs       Long Term Goals: (in 12 weeks)  1) Decrease pain in right hand to no more than 1-2/10 worst in ADL/IADL's  2) Increase strength in SF right hand by 1 muscle grade for increased functioning in ADL/IADL's  3) Increase right hand  strength by 15% or original measures for increased functional use with ADLs/IADLs  4) Increased functioning in ADL/IADL's, as evidenced by a FOTO impairment rating of no more than 35%  5) Patient will tolerate light resistive gripping with right hand for 1 minute with no rest breaks for increased functional use with ADLs/IADLs        PLAN   Plan of Care Certification: 5/6/2022 to 7/29/2022.     Outpatient Occupational Therapy 2 times weekly for 10 weeks to include the following interventions: Paraffin, Fluidotherapy, Modalities for pain management, Therapeutic exercises/activities., Strengthening, Orthotic Fabrication/Fit/Training and Joint Protection.      Niels Glover OT      I CERTIFY THE NEED FOR THESE SERVICES FURNISHED UNDER THIS PLAN OF TREATMENT AND WHILE UNDER MY CARE  Physician's comments:      Physician's Signature: ___________________________________________________

## 2022-05-18 NOTE — PROGRESS NOTES
"  [facility]  Occupational Therapy Treatment Note    Date: 5/20/2022  Name: Maninder Schneider  Clinic Number: [id number]    Therapy Diagnosis:   Encounter Diagnoses   Name Primary?    Pain Yes    Decreased strength      Physician: Maximo Mcallister MD    Physician Orders: Evaluate and treat   Medical Diagnosis: G56.21 (ICD-10-CM) - Cubital tunnel syndrome on right   Surgical Procedure and Date: N/A , N/A   Evaluation Date: 5/6/2022    Insurance Authorization Period Expiration: 04/25/2022 04/25/2023   Plan of Care Certification Period: 5/10/2022 - 7/29/2022  Progress Note Due: 6/17/2022   Date of Return to MD: 6/27/2022  Visit # / Visits authorized: 1/20  FOTO: 1/3   Initial= 45%/ Goal= 35%     Precautions:  Standard      Time In: 10:45  Time Out: 11:20  Total Billable Time: 50 minutes (3TA)   Billing: Medicaid     SUBJECTIVE     Pt reports: Writing with her right hand hurts; reports as 8/10 on pain scale, with pain over dorsal SF MCP joint radiating into hand. She also reports most pain in the morning, where she has to hold her hand with fingers flat so it stretches out and feels better. She also continues to report that any sort of pressure over MCP in the palm is very sensitive/uncomfortable with right hand use.   She was compliant with home exercise program given last session, but was inconsistent performing "whenever she remembers."   Response to previous treatment: first treatment following evaluation   Functional change: first treatment following evaluation     Pain: 0/10 at rest; 8/10 with use such as writing, mouse use, pouring bottles as    Location: right hand      OBJECTIVE     Objective Measures updated at progress report unless specified.    Treatment     Maninder received the treatments listed below:     Supervised modalities after being cleared for contradictions:   Paraffin bath - Paraffin w/ MHP to right hand for 10 min, pre-tx to decrease pain & increase tissue " extensibility      Manual therapy techniques: Joint mobilizations and Soft tissue Mobilization were applied to the: right hand for 10 minutes, including:   -light joint mobilization to metacarpals to decrease tissue adhesions and pain   -soft tissue mobilization of hypothenar muscle bulk/palmar fascia/ulnar wrist using hand techniques and IASTM tool to decrease adhesions and pain, and increase circulation and tissue extensibility      Therapeutic exercises to develop strength, endurance and ROM for 30 minutes, including:  *utilized padding on table with some exercises to stabilize MCP joint in more neutral position.   Finger abduction/adduction-all digits  15 reps    Isolated SF abduction/adduction  15 reps    Finger lifts isolated/composite  15 reps    Finger blocking SF- DIP/PIP/MCP flexion  10 reps each    Waves (MCP flex/IP  extension) with hand supinated  10 reps         Assisted ulnar nerve glide at wrist with SF/RF MCP stabilization  5 reps    Ulnar nerve glide at elbow 5 reps                    Therapeutic activities to improve functional performance for 0 minutes, including:        Patient Education and Home Exercises      Education provided:   - ulnar nerve glides   - use of wrapped towel around elbow at night to decrease elbow flexion and ease cubital tunnel symptoms    - HEP issued   - Progress towards goals     Written Home Exercises Provided: Patient instructed to cont prior HEP.  Exercises were reviewed and Maninder was able to demonstrate them prior to the end of the session.  Maninder demonstrated good  understanding of the HEP provided. See EMR under Patient Instructions for exercises provided during therapy sessions.       Assessment   Maninder Schneider is a 27 y.o. female referred to outpatient occupational therapy and presents with a medical diagnosis of G56.21 (ICD-10-CM) - Cubital tunnel syndrome on right.  Patient presents with the following therapy deficits: Decreased  strength, Decreased muscle  strength, Decreased functional hand use and Increased pain and demonstrates limitations as described in the chart below. Following medical record review it is determined that pt will benefit from occupational therapy services in order to maximize pain free and/or functional use of right hand. Patient had spots throughout distal right hand/forearm that she reported was hyperpigmentation, according to her dermatologist.  Skin was inspected prior to paraffin application; no open abrasions were noted. Patient tolerated paraffin application well with no adverse effects.  SF MCP was stabilized into more dorsal/neutral position during most above exercises to target appropriate musculature.  Patient reports increased comfort at dorsal MCP with gripping in this position but SF was noted to rotate more over RF as MCP moved more dorsally.  Will consider orthotic fabrication for stabilization of SF metacarpal/MCP joint and decrease patient's pain with functional use of her hand.  Patient is also reporting medial elbow pain and ulnar sided hand pain in the morning; may consider fabrication of elbow flexion limiting brace for night wear.              Maninder is progressing well towards her goals and there are no updates to goals at this time. Pt prognosis is Fair.     Pt will continue to benefit from skilled outpatient occupational therapy to address the deficits listed in the problem list on initial evaluation provide pt/family education and to maximize pt's level of independence in the home and community environment.     Pt's spiritual, cultural and educational needs considered and pt agreeable to plan of care and goals.    Anticipated barriers to occupational therapy: compliance with HEP, therapy attendance       Goals:   Short Term Goals: (in 6 weeks)  1) Patient will be independent in HEP -Progressing  2) Decrease pain in right hand to no more than 3-4/10 worst for increased functioning in ADL/IADL's -Progressing  3) Increase  strength in SF right hand by 1/2 muscle grade for increased functioning in ADL/IADL's -Progressing  4) Increase right hand  strength by 10% or original measures for increased functional use with ADLs/IADLs -Progressing  5) Patient will tolerate light resistive gripping with right hand for 1 minute with rest breaks as needed for increased functional use with ADLs/IADLs -Progressing        Long Term Goals: (in 12 weeks)  1) Decrease pain in right hand to no more than 1-2/10 worst in ADL/IADL's  2) Increase strength in SF right hand by 1 muscle grade for increased functioning in ADL/IADL's  3) Increase right hand  strength by 15% or original measures for increased functional use with ADLs/IADLs  4) Increased functioning in ADL/IADL's, as evidenced by a FOTO impairment rating of no more than 35%  5) Patient will tolerate light resistive gripping with right hand for 1 minute with no rest breaks for increased functional use with ADLs/IADLs      PLAN     Plan of Care Certification: 5/6/2022 to 7/29/2022.      Outpatient Occupational Therapy 2 times weekly for 10 weeks to include the following interventions: Paraffin, Fluidotherapy, Modalities for pain management, Therapeutic exercises/activities., Strengthening, Orthotic Fabrication/Fit/Training and Joint Protection.       Updates/Grading for next session: possible orthotic fabrication to decrease ulnar sided hand pain/provide stability; possible elbow splint fabrication for night use, continue strengthening of intrinsic/extrinsic hand musculature       Niels Glover, OT

## 2022-05-20 ENCOUNTER — CLINICAL SUPPORT (OUTPATIENT)
Dept: REHABILITATION | Facility: HOSPITAL | Age: 27
End: 2022-05-20
Attending: STUDENT IN AN ORGANIZED HEALTH CARE EDUCATION/TRAINING PROGRAM
Payer: MEDICAID

## 2022-05-20 DIAGNOSIS — R52 PAIN: Primary | ICD-10-CM

## 2022-05-20 DIAGNOSIS — R53.1 DECREASED STRENGTH: ICD-10-CM

## 2022-05-20 PROCEDURE — 97530 THERAPEUTIC ACTIVITIES: CPT | Mod: PN

## 2022-05-25 NOTE — PROGRESS NOTES
[facility]  Occupational Therapy Treatment Note    Date: 5/27/2022  Name: Maninder Schneider  Clinic Number: [id number]    Therapy Diagnosis:   Encounter Diagnoses   Name Primary?    Pain Yes    Decreased strength      Physician: Maximo Mcallister MD    Physician Orders: Evaluate and treat   Medical Diagnosis: G56.21 (ICD-10-CM) - Cubital tunnel syndrome on right   Surgical Procedure and Date: N/A , N/A   Evaluation Date: 5/6/2022    Insurance Authorization Period Expiration: 04/25/2022 04/25/2023   Plan of Care Certification Period: 5/10/2022 - 7/29/2022  Progress Note Due: 6/17/2022   Date of Return to MD: 6/27/2022  Visit # / Visits authorized: 2/20 (plus initial evaluation)   FOTO: 1/3     Initial= 45% / Goal= 35%     Precautions:  Standard      Time In: 10:50  Time Out: 11:45    Total Billable Time: 55 minutes (4TA)   Billing: Medicaid     SUBJECTIVE     Pt reports: She continues with reports of pain in her hand with writing and any use of hand requiring  or pressure through the palm.  She continues with reports of morning pain in the hand as well. She reports that she used her hand Wednesday to push herself up from crossed legged position and felt a pop followed by pain; she reports the pain was gone the next day.   She was compliant with home exercise program given last session, tried to be more consistent with nerve glides   Response to previous treatment: feeling less tension in elbow following near glides    Functional change: none reported     Pain: 0/10 at rest; 8/10 with use such as writing, mouse use, pouring bottles with bartending   Location: right hand      OBJECTIVE     Objective Measures updated at progress report unless specified.    Treatment     Maninder received the treatments listed below:     Supervised modalities after being cleared for contradictions:         Manual therapy techniques: Joint mobilizations and Soft tissue Mobilization were applied to the: right  hand for 0 minutes, including:     Therapeutic exercises to develop strength, endurance and ROM for 0 minutes, including:  *utilized padding on table with some exercises to stabilize MCP joint in more neutral position.   Finger abduction/adduction-all digits  15 reps    Isolated SF abduction/adduction  15 reps    Finger lifts isolated/composite  15 reps    Finger blocking SF- DIP/PIP/MCP flexion  10 reps each    Waves (MCP flex/IP  extension) with hand supinated  10 reps         Assisted ulnar nerve glide at wrist with SF/RF MCP stabilization  5 reps    Ulnar nerve glide at elbow 5 reps                    Therapeutic activities to improve functional performance for 0 minutes, including:    Maninder participated in Orthotic fitting and training for 50 minutes including the following: ()  A custom hand based ulnar gutter orthosis (with palmar stability piece placed volarlly and padding at 5th MCP joint to place MC head more dorsally/more neutral position) was fabricated today to address pain and instability in patient's right hand 5th metacarpal palmar area.  Good fit and positioning was achieved upon completion of fabrication.  Patient was provided with instruct on purpose of orthosis, proper donning/doffing, wear/care schedule and precautions to monitor.  Patient verbalized understanding of all instruction provided.      Patient Education and Home Exercises      Education provided:   - purpose of orthosis, proper donning/doffing, wear/care schedule and precautions to monitor   - ulnar nerve glides   - use of wrapped towel around elbow at night to decrease elbow flexion and ease cubital tunnel symptoms    - HEP issued   - Progress towards goals     Written Home Exercises Provided: Patient instructed to cont prior HEP.  Exercises were reviewed and Mary Loumary was able to demonstrate them prior to the end of the session.  Maninder demonstrated good  understanding of the HEP provided. See EMR under Patient Instructions for  exercises provided during therapy sessions.       Assessment   Maninder Schneider is a 27 y.o. female referred to outpatient occupational therapy and presents with a medical diagnosis of G56.21 (ICD-10-CM) - Cubital tunnel syndrome on right.  Patient presents with the following therapy deficits: Decreased  strength, Decreased muscle strength, Decreased functional hand use and Increased pain and demonstrates limitations as described in the chart below. Following medical record review it is determined that pt will benefit from occupational therapy services in order to maximize pain free and/or functional use of right hand.  Custom hand based orthosis was fabricated for stabilization of SF metacarpal/MCP joint and decrease patient's pain with functional use of her hand; please see orthotic fit and training above.  The orthosis may also be used to provide stabilization with exercises to target specific musculature in the hand.  Less rotation of the 5th digit over the 4th digit was noted with finger flexion following application of splint.  Patient was educated on introducing wear of splint for 15-30 minute increments during daily activities requiring  or pressure to the palm.  Patient was also educated on noting pressure areas so that splint can be modified next session if needed. Patient is also reporting medial elbow pain and ulnar sided hand pain in the morning; may consider fabrication of elbow flexion limiting brace for night wear.            Maninder is progressing well towards her goals and there are no updates to goals at this time. Pt prognosis is Fair.     Pt will continue to benefit from skilled outpatient occupational therapy to address the deficits listed in the problem list on initial evaluation provide pt/family education and to maximize pt's level of independence in the home and community environment.     Pt's spiritual, cultural and educational needs considered and pt agreeable to plan of care and  goals.    Anticipated barriers to occupational therapy: compliance with HEP, therapy attendance       Goals:   Short Term Goals: (in 6 weeks)  1) Patient will be independent in HEP -Progressing  2) Decrease pain in right hand to no more than 3-4/10 worst for increased functioning in ADL/IADL's -Progressing  3) Increase strength in SF right hand by 1/2 muscle grade for increased functioning in ADL/IADL's -Progressing  4) Increase right hand  strength by 10% or original measures for increased functional use with ADLs/IADLs -Progressing  5) Patient will tolerate light resistive gripping with right hand for 1 minute with rest breaks as needed for increased functional use with ADLs/IADLs -Progressing          Long Term Goals: (in 12 weeks)  1) Decrease pain in right hand to no more than 1-2/10 worst in ADL/IADL's  2) Increase strength in SF right hand by 1 muscle grade for increased functioning in ADL/IADL's  3) Increase right hand  strength by 15% or original measures for increased functional use with ADLs/IADLs  4) Increased functioning in ADL/IADL's, as evidenced by a FOTO impairment rating of no more than 35%  5) Patient will tolerate light resistive gripping with right hand for 1 minute with no rest breaks for increased functional use with ADLs/IADLs      PLAN     Plan of Care Certification: 5/6/2022 to 7/29/2022.      Outpatient Occupational Therapy 2 times weekly for 10 weeks to include the following interventions: Paraffin, Fluidotherapy, Modalities for pain management, Therapeutic exercises/activities., Strengthening, Orthotic Fabrication/Fit/Training and Joint Protection.       Updates/Grading for next session: possible orthotic fabrication to decrease ulnar sided hand pain/provide stability; possible elbow splint fabrication for night use, continue strengthening of intrinsic/extrinsic hand musculature       Niels Glover, OT

## 2022-05-27 ENCOUNTER — CLINICAL SUPPORT (OUTPATIENT)
Dept: REHABILITATION | Facility: HOSPITAL | Age: 27
End: 2022-05-27
Attending: STUDENT IN AN ORGANIZED HEALTH CARE EDUCATION/TRAINING PROGRAM
Payer: MEDICAID

## 2022-05-27 DIAGNOSIS — R53.1 DECREASED STRENGTH: ICD-10-CM

## 2022-05-27 DIAGNOSIS — R52 PAIN: Primary | ICD-10-CM

## 2022-05-27 PROCEDURE — 97760 ORTHOTIC MGMT&TRAING 1ST ENC: CPT | Mod: PN

## 2022-05-27 PROCEDURE — L3913 HFO W/O JOINTS CF: HCPCS | Mod: PN

## 2022-06-03 ENCOUNTER — CLINICAL SUPPORT (OUTPATIENT)
Dept: REHABILITATION | Facility: HOSPITAL | Age: 27
End: 2022-06-03
Attending: STUDENT IN AN ORGANIZED HEALTH CARE EDUCATION/TRAINING PROGRAM
Payer: MEDICAID

## 2022-06-03 DIAGNOSIS — R52 PAIN: Primary | ICD-10-CM

## 2022-06-03 DIAGNOSIS — R53.1 DECREASED STRENGTH: ICD-10-CM

## 2022-06-03 PROCEDURE — 97530 THERAPEUTIC ACTIVITIES: CPT | Mod: PN

## 2022-06-03 NOTE — PROGRESS NOTES
"  [facility] OUTPAT[facility] THERAPY AND WELLNESS  Occupational Therapy Treatment Note    Date: 6/3/2022  Name: Maninder Schneider  Clinic Number: [id number]    Therapy Diagnosis:   Encounter Diagnoses   Name Primary?    Pain Yes    Decreased strength      Physician: Maximo Mcallister MD    Physician Orders: Evaluate and treat   Medical Diagnosis: G56.21 (ICD-10-CM) - Cubital tunnel syndrome on right   Surgical Procedure and Date: N/A , N/A   Evaluation Date: 5/6/2022    Insurance Authorization Period Expiration: 04/25/2022 04/25/2023   Plan of Care Certification Period: 5/10/2022 - 7/29/2022  Progress Note Due: 6/17/2022   Date of Return to MD: 6/27/2022  Visit # / Visits authorized: 3/20 (plus initial evaluation)   FOTO: 1/3     Initial= 45% / Goal= 35%     Precautions:  Standard      Time In: 10:50  Time Out: 11:35   Total Billable Time: 40 minutes (4TA)   Billing: Medicaid     SUBJECTIVE     Pt reports: She reports that the splint wear has reduced her pain with driving for a few hours; she has had decreased hand pain over the last week.  She does reports the thumb motion is limited by splint on volar side. Maninder continues to report elbow pain, but less tension in elbow following nerve glides.   She was compliant with home exercise program given last session, she has tried to be more consistent with nerve glides and reports performing her HEP once per day   Response to previous treatment: less pain in hand with wear of hand splint while driving   Functional change: none reported     Pain: 3-4/10 "soreness"  Location: right hand      OBJECTIVE     Objective Measures updated at progress report unless specified.    Treatment     Maninder received the treatments listed below:     Supervised modalities after being cleared for contradictions:   Paraffin bath - Paraffin w/ MHP to right hand for 10 min, pre-tx to decrease pain & increase tissue extensibility      Manual therapy techniques: Joint mobilizations and Soft tissue " Mobilization were applied to the: right hand for 0 minutes, including:   -light joint mobilization to metacarpals to decrease tissue adhesions and pain   -soft tissue mobilization of hypothenar muscle bulk/palmar fascia/ulnar wrist using hand techniques and IASTM tool to decrease adhesions and pain, and increase circulation and tissue extensibility      Therapeutic exercises to develop strength, endurance and ROM for 15 minutes, including:  *utilized padding on table with some exercises to stabilize MCP joint in more neutral position.   Finger abduction/adduction-all digits  15 reps    Isolated SF abduction/adduction  15 reps    Finger blocking SF- DIP/PIP/MCP flexion  10 reps each    Waves (MCP flex/IP  extension) with hand supinated  10 reps         Ulnar nerve glide at elbow 5 reps                    Therapeutic activities to improve functional performance for 0 minutes, including:    Maninder participated in Orthotic fitting and training for 50 minutes including the following: ()  A custom hand based ulnar gutter orthosis (with palmar stability piece placed volarlly and padding at 5th MCP joint to place MC head more dorsally/more neutral position) was fabricated today to address pain and instability in patient's right hand 5th metacarpal palmar area.  Good fit and positioning was achieved upon completion of fabrication.  Patient was provided with instruct on purpose of orthosis, proper donning/doffing, wear/care schedule and precautions to monitor.  Patient verbalized understanding of all instruction provided.    Orthotic Fitting: 10 min 6/3/2022  Modified splint to allow increased thumb movement across palm/opposition.  Patient reported increase in comfort and thumb range of motion following modification.  Will continue to monitor and adjust as needed.     Patient Education and Home Exercises      Education provided:   - purpose of orthosis, proper donning/doffing, wear/care schedule and precautions to  "monitor   - ulnar nerve glides   - use of wrapped towel around elbow at night to decrease elbow flexion and ease cubital tunnel symptoms    - HEP issued   - Progress towards goals     Written Home Exercises Provided: Patient instructed to cont prior HEP.  Exercises were reviewed and Maninder was able to demonstrate them prior to the end of the session.  Maninder demonstrated good  understanding of the HEP provided. See EMR under Patient Instructions for exercises provided during therapy sessions.       Assessment   Maninder Schneider is a 27 y.o. female referred to outpatient occupational therapy and presents with a medical diagnosis of G56.21 (ICD-10-CM) - Cubital tunnel syndrome on right.  Patient presents with the following therapy deficits: Decreased  strength, Decreased muscle strength, Decreased functional hand use and Increased pain and demonstrates limitations as described in the chart below. Following medical record review it is determined that pt will benefit from occupational therapy services in order to maximize pain free and/or functional use of right hand.  Modifications were made to custom hand based orthosis to allow increased thumb flexion/opposition for comfort with wear.  Patient reported that splint wear during a recent longer drive decreased her hand pain.  She tolerated all exercises well without significant increased pain; she did complain of "popping" sensation medial to 5th metacarpal with isolated PIP SF flexion. Therapist was able to palpate "popping" and decreased it with radial sided stabilization to MCP joint.  She does report ocassional complaint of sensitivity with palpation volar surface SF MCP joint.  Patient continues to report medial elbow pain, but reports that there is decreased tension/pain following nerve glides.  Therapy may consider fabrication of/purchase of pre-fabricated elbow flexion limiting brace for night wear.             Maninder is progressing well towards her goals and " there are no updates to goals at this time. Pt prognosis is Fair.     Pt will continue to benefit from skilled outpatient occupational therapy to address the deficits listed in the problem list on initial evaluation provide pt/family education and to maximize pt's level of independence in the home and community environment.     Pt's spiritual, cultural and educational needs considered and pt agreeable to plan of care and goals.    Anticipated barriers to occupational therapy: compliance with HEP, therapy attendance       Goals:   Short Term Goals: (in 6 weeks)  1) Patient will be independent in HEP -Progressing  2) Decrease pain in right hand to no more than 3-4/10 worst for increased functioning in ADL/IADL's -Progressing  3) Increase strength in SF right hand by 1/2 muscle grade for increased functioning in ADL/IADL's -Progressing  4) Increase right hand  strength by 10% or original measures for increased functional use with ADLs/IADLs -Progressing  5) Patient will tolerate light resistive gripping with right hand for 1 minute with rest breaks as needed for increased functional use with ADLs/IADLs -Progressing          Long Term Goals: (in 12 weeks)  1) Decrease pain in right hand to no more than 1-2/10 worst in ADL/IADL's  2) Increase strength in SF right hand by 1 muscle grade for increased functioning in ADL/IADL's  3) Increase right hand  strength by 15% or original measures for increased functional use with ADLs/IADLs  4) Increased functioning in ADL/IADL's, as evidenced by a FOTO impairment rating of no more than 35%  5) Patient will tolerate light resistive gripping with right hand for 1 minute with no rest breaks for increased functional use with ADLs/IADLs      PLAN     Plan of Care Certification: 5/6/2022 to 7/29/2022.      Outpatient Occupational Therapy 2 times weekly for 10 weeks to include the following interventions: Paraffin, Fluidotherapy, Modalities for pain management, Therapeutic  exercises/activities., Strengthening, Orthotic Fabrication/Fit/Training and Joint Protection.       Updates/Grading for next session: possible orthotic fabrication to decrease ulnar sided hand pain/provide stability; possible elbow splint fabrication for night use, continue strengthening of intrinsic/extrinsic hand musculature       Niels Glover, OT

## 2022-06-10 ENCOUNTER — CLINICAL SUPPORT (OUTPATIENT)
Dept: REHABILITATION | Facility: HOSPITAL | Age: 27
End: 2022-06-10
Attending: STUDENT IN AN ORGANIZED HEALTH CARE EDUCATION/TRAINING PROGRAM
Payer: MEDICAID

## 2022-06-10 DIAGNOSIS — R52 PAIN: Primary | ICD-10-CM

## 2022-06-10 DIAGNOSIS — R53.1 DECREASED STRENGTH: ICD-10-CM

## 2022-06-10 PROCEDURE — 97530 THERAPEUTIC ACTIVITIES: CPT | Mod: PN

## 2022-06-10 NOTE — PROGRESS NOTES
[facility]  Occupational Therapy Treatment Note    Date: 6/10/2022  Name: Maninder Schneider  Clinic Number: [id number]    Therapy Diagnosis:   Encounter Diagnoses   Name Primary?    Pain Yes    Decreased strength      Physician: Maximo Mcallister MD    Physician Orders: Evaluate and treat   Medical Diagnosis: G56.21 (ICD-10-CM) - Cubital tunnel syndrome on right   Surgical Procedure and Date: N/A , N/A   Evaluation Date: 5/6/2022    Insurance Authorization Period Expiration: 04/25/2022 04/25/2023   Plan of Care Certification Period: 5/10/2022 - 7/29/2022  Progress Note Due: 6/17/2022   Date of Return to MD: 6/27/2022  Visit # / Visits authorized: 4/20 (plus initial evaluation)   FOTO: 1/3     Initial= 45% / Goal= 35%     Precautions:  Standard      Time In: 10:45  Time Out: 11:30  Total Billable Time: 44 minutes (3TA)   Billing: Medicaid     SUBJECTIVE     Pt reports: She reports that work has not been as busy, so she has not been able to see if splint helps to decrease pain with work activities. She continues to complain of elbow pain following nighttime.  She reports that she sleeps on her back or side with her elbows in bent/flexed position.  She also reports there is significant fatigue with worsening handwriting if she continues to write for longer periods of time.    She was compliant with home exercise program given last session; trying to work in exercises during slow times at work   Response to previous treatment: less hand pain overall, but does report things have been slower at work    Functional change: none reported     Pain: 3-4/10   Location: right hand      OBJECTIVE     Objective Measures updated at progress report unless specified.    Treatment     Maninder received the treatments listed below:     Supervised modalities after being cleared for contradictions:         Manual therapy techniques: Joint mobilizations and Soft tissue Mobilization were applied to the: right hand  for 8 minutes, including:   -light joint mobilization to metacarpals to decrease tissue adhesions and pain   -soft tissue mobilization of hypothenar muscle bulk/palmar fascia/ulnar wrist using hand techniques and IASTM tool to decrease adhesions and pain, and increase circulation and tissue extensibility      Therapeutic exercises to develop strength, endurance and ROM for  minutes, including:  *utilized padding on table with some exercises to stabilize MCP joint in more neutral position.   Finger abduction/adduction-all digits  20 reps    Isolated SF abduction/adduction  20 reps    Isometric SF/RF adduction/ abduction (ulnar and radial sides)  10 reps    Finger lifts isolated/composite  15 reps     Finger blocking SF- DIP/PIP/MCP flexion  10 reps each    Waves (MCP flex/IP  extension)   20 reps         Assisted ulnar nerve glide at wrist with SF/RF MCP stabilization  5 reps    Ulnar nerve glide at elbow 5 reps                    Therapeutic activities to improve functional performance for 0 minutes, including:  *All below performed with wear of hand orthoses.   Grasp 3# weight and place on overhead shelf  8 times    Pour 1/2 full pitcher of water out into sink  1 time   Writing with pencil (no )                             Maninder participated in Orthotic fitting and training for 50 minutes including the following: ()  A custom hand based ulnar gutter orthosis (with palmar stability piece placed volarlly and padding at 5th MCP joint to place MC head more dorsally/more neutral position) was fabricated today to address pain and instability in patient's right hand 5th metacarpal palmar area.  Good fit and positioning was achieved upon completion of fabrication.  Patient was provided with instruct on purpose of orthosis, proper donning/doffing, wear/care schedule and precautions to monitor.  Patient verbalized understanding of all instruction provided.    Orthotic Fitting: 10 min 6/3/2022  Modified splint to  "allow increased thumb movement across palm/opposition.  Patient reported increase in comfort and thumb range of motion following modification.  Will continue to monitor and adjust as needed.     *Patient reporting good fit, no modifications made 6/10/2022.     Patient Education and Home Exercises      Education provided:   - recommend purchase elbow brace with adjustable flexbar and open posterior elbow for nightwear to assist with decreasing ulnar nerve symptoms   - purpose of orthosis, proper donning/doffing, wear/care schedule and precautions to monitor   - ulnar nerve glides   - use of wrapped towel around elbow at night to decrease elbow flexion and ease cubital tunnel symptoms    - HEP issued   - Progress towards goals     Written Home Exercises Provided: Patient instructed to cont prior HEP.  Exercises were reviewed and Maninder was able to demonstrate them prior to the end of the session.  Maninder demonstrated good  understanding of the HEP provided. See EMR under Patient Instructions for exercises provided during therapy sessions.       Assessment   Maninder Schneider is a 27 y.o. female referred to outpatient occupational therapy and presents with a medical diagnosis of G56.21 (ICD-10-CM) - Cubital tunnel syndrome on right.  Patient presents with the following therapy deficits: Decreased  strength, Decreased muscle strength, Decreased functional hand use and Increased pain and demonstrates limitations as described in the chart below. Following medical record review it is determined that pt will benefit from occupational therapy services in order to maximize pain free and/or functional use of right hand.  She tolerated all exercises well without significant increase in pain; she continues to complain of "popping" sensation medial to 5th metacarpal with isolated PIP SF flexion. Therapist was able to palpate "popping" and decrease (but not eliminate) with radial sided stabilization to MCP joint.  She also " continues to report ocassional complaint of sensitivity with palpation volar surface SF MCP joint.  Progressed to isometrics for RF/SF intrinsics without issues.  Patient was provided with handout on available elbow brace to decrease flexion during sleep; therapy recommends purchase of one with flexbar and posterior elbow opening to increase comfort and compliance.  She was also encouraged to continue nerve glides. Patient performed 3 functional tasks (as noted in therapeutic activities chart above) with hand based orthoses donned.  She had no complaints of hand pain with these activities.       Maninder is progressing well towards her goals and there are no updates to goals at this time. Pt prognosis is Fair.     Pt will continue to benefit from skilled outpatient occupational therapy to address the deficits listed in the problem list on initial evaluation provide pt/family education and to maximize pt's level of independence in the home and community environment.     Pt's spiritual, cultural and educational needs considered and pt agreeable to plan of care and goals.    Anticipated barriers to occupational therapy: compliance with HEP, therapy attendance       Goals:   Short Term Goals: (in 6 weeks)  1) Patient will be independent in HEP -Progressing  2) Decrease pain in right hand to no more than 3-4/10 worst for increased functioning in ADL/IADL's -Progressing  3) Increase strength in SF right hand by 1/2 muscle grade for increased functioning in ADL/IADL's -Progressing  4) Increase right hand  strength by 10% or original measures for increased functional use with ADLs/IADLs -Progressing  5) Patient will tolerate light resistive gripping with right hand for 1 minute with rest breaks as needed for increased functional use with ADLs/IADLs -Progressing          Long Term Goals: (in 12 weeks)  1) Decrease pain in right hand to no more than 1-2/10 worst in ADL/IADL's  2) Increase strength in SF right hand by 1  muscle grade for increased functioning in ADL/IADL's  3) Increase right hand  strength by 15% or original measures for increased functional use with ADLs/IADLs  4) Increased functioning in ADL/IADL's, as evidenced by a FOTO impairment rating of no more than 35%  5) Patient will tolerate light resistive gripping with right hand for 1 minute with no rest breaks for increased functional use with ADLs/IADLs      PLAN     Plan of Care Certification: 5/6/2022 to 7/29/2022.      Outpatient Occupational Therapy 2 times weekly for 10 weeks to include the following interventions: Paraffin, Fluidotherapy, Modalities for pain management, Therapeutic exercises/activities., Strengthening, Orthotic Fabrication/Fit/Training and Joint Protection.       Updates/Grading for next session: possible orthotic fabrication to decrease ulnar sided hand pain/provide stability; possible elbow splint fabrication for night use, continue strengthening of intrinsic/extrinsic hand musculature       Niels Glover, OT

## 2022-06-17 ENCOUNTER — CLINICAL SUPPORT (OUTPATIENT)
Dept: REHABILITATION | Facility: HOSPITAL | Age: 27
End: 2022-06-17
Attending: STUDENT IN AN ORGANIZED HEALTH CARE EDUCATION/TRAINING PROGRAM
Payer: MEDICAID

## 2022-06-17 DIAGNOSIS — R53.1 DECREASED STRENGTH: ICD-10-CM

## 2022-06-17 DIAGNOSIS — R52 PAIN: Primary | ICD-10-CM

## 2022-06-17 PROCEDURE — 97530 THERAPEUTIC ACTIVITIES: CPT | Mod: PN

## 2022-06-17 NOTE — PROGRESS NOTES
[facility]  Occupational Therapy Treatment Note    Date: 6/17/2022  Name: Maninder Schneider  Clinic Number: [id number]    Therapy Diagnosis:   Encounter Diagnoses   Name Primary?    Pain Yes    Decreased strength      Physician: Maximo Mcallister MD    Physician Orders: Evaluate and treat   Medical Diagnosis: G56.21 (ICD-10-CM) - Cubital tunnel syndrome on right   Surgical Procedure and Date: N/A , N/A   Evaluation Date: 5/6/2022    Insurance Authorization Period Expiration: 04/25/2022 04/25/2023   Plan of Care Certification Period: 5/10/2022 - 7/29/2022  Progress Note Due: 6/17/2022   Date of Return to MD: 6/27/2022  Visit # / Visits authorized: 5/20 (plus initial evaluation)   FOTO: 1/3     Initial= 45% / Goal= 35%     Precautions:  Standard      Time In: 10:50   Time Out: 11:30  Total Billable Time: 35 minutes (2TA)   Billing: Medicaid     SUBJECTIVE     Pt reports: She continues to report that work has not been as busy, so she has not been able to see if splint helps to decrease pain with work activities. She did order and receive recommended elbow brace. She has tried to wear on 2 occassions and reports that she could not tolerate it greater than 10 minutes each time due to ulnar sided hand pain.  She reports that the pain did not radiate from the elbow, but was localized to the hand.  She also continues to report there is significant fatigue with worsening handwriting if she continues to write for longer periods of time. Maninder also reports that she was unsure if there was less hand pain due to not being as busy at work.     She was compliant with home exercise program given last session; trying to work in exercises during slow times at work including nerve glides     Response to previous treatment: none reported   Functional change: none reported     Pain: 3-4/10    Location: right hand      OBJECTIVE     Objective Measures updated at progress report unless specified.    Treatment      Maninder received the treatments listed below:     Supervised modalities after being cleared for contradictions:   Paraffin bath - Paraffin w/ MHP to right hand for 10 min, pre-tx to decrease pain & increase tissue extensibility       Manual therapy techniques: Joint mobilizations and Soft tissue Mobilization were applied to the: right hand for 8 minutes, including:     -soft tissue mobilization of hypothenar muscle bulk/palmar fascia/ulnar wrist using hand techniques and IASTM tool to decrease adhesions and pain, and increase circulation and tissue extensibility      Therapeutic exercises to develop strength, endurance and ROM for 2 minutes, including:  *utilized padding on table with some exercises to stabilize MCP joint in more neutral position.        Assisted ulnar nerve glide at wrist with SF/RF MCP stabilization  5 reps    Ulnar nerve glide at elbow 5 reps                    Therapeutic activities to improve functional performance for 10 minutes, including:  *First 3 below performed with wear of hand orthoses.   Positioning for sleep with elbow brace donned in supine and side-lying positions                        Maninder participated in Orthotic fitting and training for 50 minutes including the following: ()  A custom hand based ulnar gutter orthosis (with palmar stability piece placed volarlly and padding at 5th MCP joint to place MC head more dorsally/more neutral position) was fabricated today to address pain and instability in patient's right hand 5th metacarpal palmar area.  Good fit and positioning was achieved upon completion of fabrication.  Patient was provided with instruct on purpose of orthosis, proper donning/doffing, wear/care schedule and precautions to monitor.  Patient verbalized understanding of all instruction provided.    Orthotic Fitting: 10 min 6/3/2022  Modified splint to allow increased thumb movement across palm/opposition.  Patient reported increase in comfort and thumb range  of motion following modification.  Will continue to monitor and adjust as needed.     *Patient reporting good fit, no modifications made 6/10/2022.     Ortho fittin min 2022  Measured and adjusted metal bar of elbow flexion limiting brace to 30 degrees of elbow flexion to allow resting of ulnar nerve during night time wear.  Reviewed with patient proper donning technique of elbow brace.  Patient demonstrated understanding.       Patient Education and Home Exercises      Education provided:  - Positioning with pillows for increased comfort in supine and side-lying with wear of elbow brace at night time    - recommend purchase elbow brace with adjustable flexbar and open posterior elbow for nightwear to assist with decreasing ulnar nerve symptoms   - purpose of orthosis, proper donning/doffing, wear/care schedule and precautions to monitor   - ulnar nerve glides   - use of wrapped towel around elbow at night to decrease elbow flexion and ease cubital tunnel symptoms    - HEP issued   - Progress towards goals     Written Home Exercises Provided: Patient instructed to cont prior HEP.  Exercises were reviewed and Maninder was able to demonstrate them prior to the end of the session.  Maninder demonstrated good  understanding of the HEP provided. See EMR under Patient Instructions for exercises provided during therapy sessions.       Assessment   Maninder Schneider is a 27 y.o. female referred to outpatient occupational therapy and presents with a medical diagnosis of G56.21 (ICD-10-CM) - Cubital tunnel syndrome on right.  Patient presents with the following therapy deficits: Decreased  strength, Decreased muscle strength, Decreased functional hand use and Increased pain and demonstrates limitations as described in the chart below. Following medical record review it is determined that pt will benefit from occupational therapy services in order to maximize pain free and/or functional use of right hand.  Due to patient's  complaints of increased hand pain with less than 10 minute of elbow brace wear, time was spent in today's session reviewing donning/positioning of brace on elbow as well as using pillows for increased comfort in supine and side-lying positions.  Flexible metal brace bar was adjusted so that elbow was approximately 30 degrees of elbow flexion.  Patient was encouraged to utilize reviewed instruction/information to increase comfort, and therefore compliance with brace wear at night time.  She was also encouraged to continue nerve glides.     Maninder is progressing well towards her goals and there are no updates to goals at this time. Pt prognosis is Fair.     Pt will continue to benefit from skilled outpatient occupational therapy to address the deficits listed in the problem list on initial evaluation provide pt/family education and to maximize pt's level of independence in the home and community environment.     Pt's spiritual, cultural and educational needs considered and pt agreeable to plan of care and goals.    Anticipated barriers to occupational therapy: compliance with HEP, therapy attendance       Goals:   Short Term Goals: (in 6 weeks)  1) Patient will be independent in HEP -Progressing  2) Decrease pain in right hand to no more than 3-4/10 worst for increased functioning in ADL/IADL's -Progressing  3) Increase strength in SF right hand by 1/2 muscle grade for increased functioning in ADL/IADL's -Progressing  4) Increase right hand  strength by 10% or original measures for increased functional use with ADLs/IADLs -Progressing  5) Patient will tolerate light resistive gripping with right hand for 1 minute with rest breaks as needed for increased functional use with ADLs/IADLs -Progressing          Long Term Goals: (in 12 weeks)  1) Decrease pain in right hand to no more than 1-2/10 worst in ADL/IADL's  2) Increase strength in SF right hand by 1 muscle grade for increased functioning in ADL/IADL's  3)  Increase right hand  strength by 15% or original measures for increased functional use with ADLs/IADLs  4) Increased functioning in ADL/IADL's, as evidenced by a FOTO impairment rating of no more than 35%  5) Patient will tolerate light resistive gripping with right hand for 1 minute with no rest breaks for increased functional use with ADLs/IADLs      PLAN     Plan of Care Certification: 5/6/2022 to 7/29/2022.      Outpatient Occupational Therapy 2 times weekly for 10 weeks to include the following interventions: Paraffin, Fluidotherapy, Modalities for pain management, Therapeutic exercises/activities., Strengthening, Orthotic Fabrication/Fit/Training and Joint Protection.       Updates/Grading for next session:  continue strengthening of intrinsic/extrinsic hand musculature, nerve glides, progress to light strengthening as tolerated        Niels Glover, OT

## 2022-06-24 ENCOUNTER — CLINICAL SUPPORT (OUTPATIENT)
Dept: REHABILITATION | Facility: HOSPITAL | Age: 27
End: 2022-06-24
Attending: STUDENT IN AN ORGANIZED HEALTH CARE EDUCATION/TRAINING PROGRAM
Payer: MEDICAID

## 2022-06-24 DIAGNOSIS — R52 PAIN: Primary | ICD-10-CM

## 2022-06-24 DIAGNOSIS — R53.1 DECREASED STRENGTH: ICD-10-CM

## 2022-06-24 PROCEDURE — 97530 THERAPEUTIC ACTIVITIES: CPT | Mod: PN

## 2022-06-24 NOTE — PROGRESS NOTES
[facility]  Occupational Therapy Treatment Note    Date: 6/24/2022  Name: Maninder Schneider  Clinic Number: [id number]    Therapy Diagnosis:   Encounter Diagnoses   Name Primary?    Pain Yes    Decreased strength      Physician: Maximo Mcallister MD    Physician Orders: Evaluate and treat   Medical Diagnosis: G56.21 (ICD-10-CM) - Cubital tunnel syndrome on right   Surgical Procedure and Date: N/A , N/A   Evaluation Date: 5/6/2022    Insurance Authorization Period Expiration: 04/25/2022 04/25/2023   Plan of Care Certification Period: 5/10/2022 - 7/29/2022  Progress Note Due: 6/17/2022   Date of Return to MD: 6/27/2022  Visit # / Visits authorized: 6/20 (plus initial evaluation)   FOTO: 1/3     Initial= 45% / Goal= 35%     Precautions:  Standard      Time In: 10:50    Time Out: 11:35  Total Billable Time: 45 minutes (3TA)   Billing: Medicaid     SUBJECTIVE     Pt reports: She has worn elbow brace 2 nights since last session; She reports today (as she previously did) that elbow brace wear resulted in ulnar sided pain localized to the hand.  There was no radiating pain from the elbow.  She also reports one day of increased pain at work due to increase in typing on computer that day.  She was not wearing hand splint; had forgotten at home. She continues with reports of decreased control and pain with writing for extended periods of time.     She was compliant with home exercise program given last session; tries to work in exercises during slow times at work, including nerve glides     Response to previous treatment: none reported   Functional change: none reported     Pain: 3-4/10    Location: right hand      OBJECTIVE     Objective Measures updated at progress report unless specified.    Treatment     Maninder received the treatments listed below:     Supervised modalities after being cleared for contradictions:          Manual therapy techniques: Joint mobilizations and Soft tissue Mobilization  were applied to the: right hand/forearm for 15 minutes, including:   -light joint mobilization to metacarpals to decrease tissue adhesions and pain   -soft tissue mobilization of hypothenar muscle bulk/palmar fascia/ulnar wrist using hand techniques and IASTM tool to decrease adhesions and pain, and increase circulation and tissue extensibility   -soft tissue mobilization using cupping, IASTM tools to right volar/dorsal forearm and lateral/medial/anterior/posterior elbow to decrease adhesions and pain, and increase circulation and tissue extensibility          Therapeutic exercises to develop strength, endurance and ROM for 23 minutes, including:  *utilized padding on table with some exercises to stabilize MCP joint in more neutral position.   Finger abduction/adduction-all digits, yellow rubberband 20 reps    Isolated SF/RF abduction/adduction, yellow rubberband  20 reps each    SF Finger lifts isolated, edge of table, tan rubberband 10 reps    SF MCP/PIP flexion,   yellow rubberband  10 reps each joint    Lumbrical ,  blue sponge  Not today   Digiflex yellow neutral, pronated, supinated  20 reps * difficulty with power         Assisted ulnar nerve glide at wrist with SF/RF MCP stabilization  5 reps    Ulnar nerve glide at elbow 5 reps    Supine with wrist flex extension ulnar nerve glide  Not today       Therapeutic activities to improve functional performance for 2 minutes, including:  *First 3 below performed with wear of hand orthoses.   Positioning for sleep with elbow brace donned in supine and side-lying positions    Observation of pencil  with writing                     Maninder participated in Orthotic fitting and training for 50 minutes including the following: ()  A custom hand based ulnar gutter orthosis (with palmar stability piece placed volarlly and padding at 5th MCP joint to place MC head more dorsally/more neutral position) was fabricated today to address pain and instability in  patient's right hand 5th metacarpal palmar area.  Good fit and positioning was achieved upon completion of fabrication.  Patient was provided with instruct on purpose of orthosis, proper donning/doffing, wear/care schedule and precautions to monitor.  Patient verbalized understanding of all instruction provided.    Orthotic Fitting: 10 min 6/3/2022  Modified splint to allow increased thumb movement across palm/opposition.  Patient reported increase in comfort and thumb range of motion following modification.  Will continue to monitor and adjust as needed.     *Patient reporting good fit, no modifications made 6/10/2022.     Ortho fittin min 2022  Measured and adjusted metal bar of elbow flexion limiting brace to 30 degrees of elbow flexion to allow resting of ulnar nerve during night time wear.  Reviewed with patient proper donning technique of elbow brace.  Patient demonstrated understanding.       Patient Education and Home Exercises      Education provided:  - Positioning with pillows for increased comfort in supine and side-lying with wear of elbow brace at night time    - recommend purchase elbow brace with adjustable flexbar and open posterior elbow for nightwear to assist with decreasing ulnar nerve symptoms   - purpose of orthosis, proper donning/doffing, wear/care schedule and precautions to monitor   - ulnar nerve glides   - use of wrapped towel around elbow at night to decrease elbow flexion and ease cubital tunnel symptoms    - HEP issued   - Progress towards goals     Written Home Exercises Provided: Patient instructed to cont prior HEP.  Exercises were reviewed and Maninder was able to demonstrate them prior to the end of the session.  Maninder demonstrated good  understanding of the HEP provided. See EMR under Patient Instructions for exercises provided during therapy sessions.       Assessment   Maninder Schneider is a 27 y.o. female referred to outpatient occupational therapy and presents with a  medical diagnosis of G56.21 (ICD-10-CM) - Cubital tunnel syndrome on right.  Patient presents with the following therapy deficits: Decreased  strength, Decreased muscle strength, Decreased functional hand use and Increased pain and demonstrates limitations as described in the chart below. Following medical record review it is determined that pt will benefit from occupational therapy services in order to maximize pain free and/or functional use of right hand.  Patient reports she utilized postioning techniques reviewed at last session for night time elbow brace wear on 2 occasions/nights.  She is continuing to report localized ulnar sided hand pain with brace wear, with no radiating pain from elbow to hand or hand to elbow.  Soft tissue mobilization expanded today to include forearm and elbow.  There was minimal density of elbow extensors and minimal fibrotic tissue in volar forearm and posterior elbow.  Advanced SF/RF strengthening exercises today.  Patient tolerated well overall, but demonstrated mininal difficulty with lightly resisted isolated SF extension and digiflex in pronated position (power ).  Maninder was also bserved to hold her pen/pencil with more distally from the end with writing tasks and loose tripod pinch.  She was issued soft tripod pencil  to trial with handwriting tasks.  She was also encouraged to continue nerve glides and HEP at home.      Maninder is progressing well towards her goals and there are no updates to goals at this time. Pt prognosis is Fair.     Pt will continue to benefit from skilled outpatient occupational therapy to address the deficits listed in the problem list on initial evaluation provide pt/family education and to maximize pt's level of independence in the home and community environment.     Pt's spiritual, cultural and educational needs considered and pt agreeable to plan of care and goals.    Anticipated barriers to occupational therapy: compliance with HEP,  therapy attendance       Goals:   Short Term Goals: (in 6 weeks)  1) Patient will be independent in HEP -Met 6/24/2022   2) Decrease pain in right hand to no more than 3-4/10 worst for increased functioning in ADL/IADL's -Progressing  3) Increase strength in SF right hand by 1/2 muscle grade for increased functioning in ADL/IADL's -Progressing  4) Increase right hand  strength by 10% or original measures for increased functional use with ADLs/IADLs -Progressing  5) Patient will tolerate light resistive gripping with right hand for 1 minute with rest breaks as needed for increased functional use with ADLs/IADLs -Progressing          Long Term Goals: (in 12 weeks)  1) Decrease pain in right hand to no more than 1-2/10 worst in ADL/IADL's  2) Increase strength in SF right hand by 1 muscle grade for increased functioning in ADL/IADL's  3) Increase right hand  strength by 15% or original measures for increased functional use with ADLs/IADLs  4) Increased functioning in ADL/IADL's, as evidenced by a FOTO impairment rating of no more than 35%  5) Patient will tolerate light resistive gripping with right hand for 1 minute with no rest breaks for increased functional use with ADLs/IADLs      PLAN     Plan of Care Certification: 5/6/2022 to 7/29/2022.      Outpatient Occupational Therapy 2 times weekly for 10 weeks to include the following interventions: Paraffin, Fluidotherapy, Modalities for pain management, Therapeutic exercises/activities., Strengthening, Orthotic Fabrication/Fit/Training and Joint Protection.       Updates/Grading for next session:  continue strengthening of intrinsic/extrinsic hand musculature, nerve glides, progress with light strengthening as tolerated        Niels Glover, OT

## 2022-06-27 ENCOUNTER — OFFICE VISIT (OUTPATIENT)
Dept: SPORTS MEDICINE | Facility: CLINIC | Age: 27
End: 2022-06-27
Payer: MEDICAID

## 2022-06-27 VITALS — WEIGHT: 192.44 LBS | RESPIRATION RATE: 20 BRPM | HEIGHT: 62 IN | BODY MASS INDEX: 35.41 KG/M2

## 2022-06-27 DIAGNOSIS — S62.356S CLOSED NONDISPLACED FRACTURE OF SHAFT OF FIFTH METACARPAL BONE OF RIGHT HAND, SEQUELA: ICD-10-CM

## 2022-06-27 DIAGNOSIS — G56.21 CUBITAL TUNNEL SYNDROME ON RIGHT: Primary | ICD-10-CM

## 2022-06-27 PROCEDURE — 99213 OFFICE O/P EST LOW 20 MIN: CPT | Mod: PBBFAC | Performed by: STUDENT IN AN ORGANIZED HEALTH CARE EDUCATION/TRAINING PROGRAM

## 2022-06-27 PROCEDURE — 99999 PR PBB SHADOW E&M-EST. PATIENT-LVL III: ICD-10-PCS | Mod: PBBFAC,,, | Performed by: STUDENT IN AN ORGANIZED HEALTH CARE EDUCATION/TRAINING PROGRAM

## 2022-06-27 PROCEDURE — 3008F BODY MASS INDEX DOCD: CPT | Mod: CPTII,,, | Performed by: STUDENT IN AN ORGANIZED HEALTH CARE EDUCATION/TRAINING PROGRAM

## 2022-06-27 PROCEDURE — 99213 OFFICE O/P EST LOW 20 MIN: CPT | Mod: S$PBB,,, | Performed by: STUDENT IN AN ORGANIZED HEALTH CARE EDUCATION/TRAINING PROGRAM

## 2022-06-27 PROCEDURE — 99999 PR PBB SHADOW E&M-EST. PATIENT-LVL III: CPT | Mod: PBBFAC,,, | Performed by: STUDENT IN AN ORGANIZED HEALTH CARE EDUCATION/TRAINING PROGRAM

## 2022-06-27 PROCEDURE — 99213 PR OFFICE/OUTPT VISIT, EST, LEVL III, 20-29 MIN: ICD-10-PCS | Mod: S$PBB,,, | Performed by: STUDENT IN AN ORGANIZED HEALTH CARE EDUCATION/TRAINING PROGRAM

## 2022-06-27 PROCEDURE — 1159F PR MEDICATION LIST DOCUMENTED IN MEDICAL RECORD: ICD-10-PCS | Mod: CPTII,,, | Performed by: STUDENT IN AN ORGANIZED HEALTH CARE EDUCATION/TRAINING PROGRAM

## 2022-06-27 PROCEDURE — 1159F MED LIST DOCD IN RCRD: CPT | Mod: CPTII,,, | Performed by: STUDENT IN AN ORGANIZED HEALTH CARE EDUCATION/TRAINING PROGRAM

## 2022-06-27 PROCEDURE — 3008F PR BODY MASS INDEX (BMI) DOCUMENTED: ICD-10-PCS | Mod: CPTII,,, | Performed by: STUDENT IN AN ORGANIZED HEALTH CARE EDUCATION/TRAINING PROGRAM

## 2022-06-27 NOTE — PROGRESS NOTES
Patient ID: Maninder Schneider  YOB: 1995  MRN: [id number]    Chief Complaint: Follow-up (Closed nondisplaced fracture of shaft of fifth metacarpal bone of right hand)      History of Present Illness: Maninder Schneider is a right-hand dominant 27 y.o. female who presents today with 0/10 pain c/o Closed nondisplaced fracture of shaft of fifth metacarpal bone of right hand.     The patient is active in none.  Occupation: [employer]     Has been doing occupational therapy feels like her hand weakness and pain overall has been improving.  No shooting pains at rest anymore.  Feels like she has been strengthening some of her pinky and ring finger muscles and has been doing her home exercises regularly.  She is interested in continuing to extend her occupational therapy as she has seen improvements with this.    Past Medical History:   No past medical history on file.  No past surgical history on file.  Family History   Problem Relation Age of Onset    No Known Problems Mother     No Known Problems Father      Social History     Socioeconomic History    Marital status: Single   Tobacco Use    Smoking status: Former Smoker    Smokeless tobacco: Current User    Tobacco comment: Vape   Substance and Sexual Activity    Alcohol use: Never    Drug use: Never    Sexual activity: Yes     Partners: Male     Medication List with Changes/Refills   Current Medications    METFORMIN (GLUCOPHAGE-XR) 500 MG ER 24HR TABLET    Take 500 mg by mouth.    METHYLPHENIDATE HCL 27 MG CR TABLET    Concerta Take No date recorded No form recorded No frequency recorded No route recorded No set duration recorded No set duration amount recorded active No dosage strength recorded No dosage strength units of measure recorded    METHYLPHENIDATE HCL 54 MG CR TABLET    Take 54 mg by mouth every morning.    NABUMETONE (RELAFEN) 500 MG TABLET    Take 500 mg by mouth 2 (two) times daily as needed.    PREDNISONE (DELTASONE) 20 MG TABLET     Take 20 mg by mouth 2 (two) times daily.     Review of patient's allergies indicates:   Allergen Reactions    Tetanus vaccines and toxoid Rash and Swelling       Physical Exam:   Body mass index is 35.78 kg/m².    GENERAL: Well appearing, in no acute distress.  HEAD: Normocephalic and atraumatic.  ENT: External ears and nose grossly normal.  EYES: EOMI bilaterally  PULMONARY: Respirations are grossly even and non-labored.  NEURO: Awake, alert, and oriented x 3.  SKIN: No obvious rashes appreciated.  PSYCH: Mood & affect are appropriate.    Detailed MSK exam:     Able open close fist similar mild rotation appreciated of the pinky finger with closing sensation intact distally some intrinsic weakness still appreciated with ulnar motor nerve testing neurovascularly intact otherwise distally negative Froment's sign.     Imaging:    No new imaging    Assessment:  Maninder Schneider is a 27 y.o. female presents today for follow-up for cubital tunnel of the right upper extremity.  She has seen improvements has no gross atrophy appreciated today in sensation is intact she still has some weakness with intrinsics and continues to see improvements with occupational therapy.  We will extend her occupational therapy order and she will follow up with me if she is not happy with the continued improvement plateaus or gets worse.  Consider referral hand surgery at that time.    Cubital tunnel syndrome on right    Closed nondisplaced fracture of shaft of fifth metacarpal bone of right hand, sequela           Maximo Mcallister MD    Disclaimer: This note was prepared using a voice recognition system and is likely to have sound alike errors within the text.

## 2022-07-01 ENCOUNTER — CLINICAL SUPPORT (OUTPATIENT)
Dept: REHABILITATION | Facility: HOSPITAL | Age: 27
End: 2022-07-01
Attending: STUDENT IN AN ORGANIZED HEALTH CARE EDUCATION/TRAINING PROGRAM
Payer: MEDICAID

## 2022-07-01 DIAGNOSIS — R52 PAIN: Primary | ICD-10-CM

## 2022-07-01 DIAGNOSIS — G56.21 CUBITAL TUNNEL SYNDROME ON RIGHT: ICD-10-CM

## 2022-07-01 DIAGNOSIS — R53.1 DECREASED STRENGTH: ICD-10-CM

## 2022-07-01 PROCEDURE — 97530 THERAPEUTIC ACTIVITIES: CPT | Mod: PN

## 2022-07-01 NOTE — PROGRESS NOTES
"  [facility]  Occupational Therapy Treatment Note    Date: 7/1/2022  Name: Maninder Schneider  Clinic Number: [id number]    Therapy Diagnosis:   Encounter Diagnoses   Name Primary?    Cubital tunnel syndrome on right     Pain Yes    Decreased strength      Physician: Maximo Mcallister MD    Physician Orders: Evaluate and treat   Medical Diagnosis: G56.21 (ICD-10-CM) - Cubital tunnel syndrome on right   Surgical Procedure and Date: N/A , N/A   Evaluation Date: 5/6/2022    Insurance Authorization Period Expiration: 04/25/2022 04/25/2023   Plan of Care Certification Period: 5/10/2022 - 7/29/2022   Progress Note Due: 6/17/2022   Date of Return to MD: none at this time   Visit # / Visits authorized: 7/20 (plus initial evaluation)   FOTO: 1/3    Initial= 45% / Goal= 35%     Precautions:  Standard      Time In: 10:50    Time Out: 11:35  Total Billable Time: 50 minutes (3TA)   Billing: Medicaid     SUBJECTIVE     Pt reports: She has not been compliant with elbow brace wear due to fatigue following work.  She continues to reports hand pain with writing and computer use for longer periods of time.  She attempted to utilize pencil  with writing but reported it was different to utilize and felt "strange".   She does utilize splint with writing at times, but reports she has to move elbow more with splint donned and it causes "aggravation" at elbow. She verbalized that this is not a direct pain, but more of an soreness. Maninder reports she did not note a difference with soft tissue mobilization applied to forearm and elbow after last session.           She was compliant with home exercise program given last session; tries to work in exercises during slow times at work, including nerve glides       Response to previous treatment: finger/hand soreness following light resistive rubberband exercises      Functional change: none reported     Pain: 3-4/10  with writing/computer use   Location: right hand  "     OBJECTIVE     Objective Measures updated at progress report unless specified.    Treatment     Maninder received the treatments listed below:     Supervised modalities after being cleared for contradictions:   Paraffin bath - Paraffin w/ MHP to right hand for 10 min, pre-tx to decrease pain & increase tissue extensibility       Manual therapy techniques: Joint mobilizations and Soft tissue Mobilization were applied to the: right hand/forearm for 15 minutes, including:   -light joint mobilization to metacarpals to decrease tissue adhesions and pain   -soft tissue mobilization of hypothenar muscle bulk/palmar fascia/ulnar wrist using hand techniques and IASTM tool to decrease adhesions and pain, and increase circulation and tissue extensibility   -soft tissue mobilization using cupping, IASTM tools to right volar/dorsal forearm (no forearm today, 7/1/2022) and lateral/medial/anterior/posterior elbow to decrease adhesions and pain, and increase circulation and tissue extensibility          Therapeutic exercises to develop strength, endurance and ROM for 25 minutes, including:  *utilized padding on table with some exercises to stabilize MCP joint in more neutral position.   Finger abduction/adduction-all digits, yellow rubberband 20 reps    Isolated SF/RF abduction/adduction, yellow rubberband  20 reps each    SF Finger lifts isolated, edge of table, tan rubberband 15 reps    SF MCP/PIP flexion,   yellow rubberband  15 reps each joint    Lumbrical , blue sponge  10 reps with RF/SF only    Digiflex yellow neutral, pronated, supinated  20 reps * difficulty with power         Assisted ulnar nerve glide at wrist with SF/RF MCP stabilization  5 reps *instructed patient how to perform today   Ulnar nerve glide at elbow 5 reps X 5 sec hold    Supine with wrist flex extension ulnar nerve glide  Not today       Therapeutic activities to improve functional performance for 0 minutes, including:  *First 3 below performed  with wear of hand orthoses.   Positioning for sleep with elbow brace donned in supine and side-lying positions    Observation of pencil  with writing                     Maninder participated in Orthotic fitting and training for 50 minutes including the following: ()  A custom hand based ulnar gutter orthosis (with palmar stability piece placed volarlly and padding at 5th MCP joint to place MC head more dorsally/more neutral position) was fabricated today to address pain and instability in patient's right hand 5th metacarpal palmar area.  Good fit and positioning was achieved upon completion of fabrication.  Patient was provided with instruct on purpose of orthosis, proper donning/doffing, wear/care schedule and precautions to monitor.  Patient verbalized understanding of all instruction provided.     Orthotic Fitting: 10 min 6/3/2022  Modified splint to allow increased thumb movement across palm/opposition.  Patient reported increase in comfort and thumb range of motion following modification.  Will continue to monitor and adjust as needed.     *Patient reporting good fit, no modifications made 6/10/2022.     Ortho fittin min 2022  Measured and adjusted metal bar of elbow flexion limiting brace to 30 degrees of elbow flexion to allow resting of ulnar nerve during night time wear.  Reviewed with patient proper donning technique of elbow brace.  Patient demonstrated understanding.       Patient Education and Home Exercises      Education provided:  - issued pink sponge for lumbrical strengthening 2022  - Positioning with pillows for increased comfort in supine and side-lying with wear of elbow brace at night time    - recommend purchase elbow brace with adjustable flexbar and open posterior elbow for nightwear to assist with decreasing ulnar nerve symptoms   - purpose of orthosis, proper donning/doffing, wear/care schedule and precautions to monitor   - ulnar nerve glides   - use of wrapped towel  around elbow at night to decrease elbow flexion and ease cubital tunnel symptoms    - HEP issued   - Progress towards goals     Written Home Exercises Provided: Patient instructed to cont prior HEP.  Exercises were reviewed and Maninder was able to demonstrate them prior to the end of the session.  Maninder demonstrated good  understanding of the HEP provided. See EMR under Patient Instructions for exercises provided during therapy sessions.       Assessment   Maninder Schneider is a 27 y.o. female referred to outpatient occupational therapy and presents with a medical diagnosis of G56.21 (ICD-10-CM) - Cubital tunnel syndrome on right.  Patient presents with the following therapy deficits: Decreased  strength, Decreased muscle strength, Decreased functional hand use and Increased pain and demonstrates limitations as described in the chart below. Following medical record review it is determined that pt will benefit from occupational therapy services in order to maximize pain free and/or functional use of right hand.  Continued with advanced SF/RF strengthening exercises; patient tolerated well overall with no significant pain increase.  Patient does demonstrate minimal difficulty isolating SF extension against light resistance and with power  in pronated position.  Maninder was instructed on how to perform ulnar nerve glide at wrist level and demonstrated understanding.  She was also encouraged to continue nerve glides and HEP at home.      Maninder is progressing well towards her goals and there are no updates to goals at this time. Pt prognosis is Fair.     Pt will continue to benefit from skilled outpatient occupational therapy to address the deficits listed in the problem list on initial evaluation provide pt/family education and to maximize pt's level of independence in the home and community environment.     Pt's spiritual, cultural and educational needs considered and pt agreeable to plan of care and  goals.    Anticipated barriers to occupational therapy: compliance with HEP, therapy attendance       Goals:   Short Term Goals: (in 6 weeks)  1) Patient will be independent in HEP -Met 6/24/2022   2) Decrease pain in right hand to no more than 3-4/10 worst for increased functioning in ADL/IADL's -Progressing  3) Increase strength in SF right hand by 1/2 muscle grade for increased functioning in ADL/IADL's -Progressing  4) Increase right hand  strength by 10% or original measures for increased functional use with ADLs/IADLs -Progressing  5) Patient will tolerate light resistive gripping with right hand for 1 minute with rest breaks as needed for increased functional use with ADLs/IADLs -Progressing          Long Term Goals: (in 12 weeks)  1) Decrease pain in right hand to no more than 1-2/10 worst in ADL/IADL's  2) Increase strength in SF right hand by 1 muscle grade for increased functioning in ADL/IADL's  3) Increase right hand  strength by 15% or original measures for increased functional use with ADLs/IADLs  4) Increased functioning in ADL/IADL's, as evidenced by a FOTO impairment rating of no more than 35%  5) Patient will tolerate light resistive gripping with right hand for 1 minute with no rest breaks for increased functional use with ADLs/IADLs      PLAN     Plan of Care Certification: 5/6/2022 to 7/29/2022.      Outpatient Occupational Therapy 2 times weekly for 10 weeks to include the following interventions: Paraffin, Fluidotherapy, Modalities for pain management, Therapeutic exercises/activities., Strengthening, Orthotic Fabrication/Fit/Training and Joint Protection.       Updates/Grading for next session:  continue strengthening of intrinsic/extrinsic hand musculature, nerve glides, progress with light strengthening as tolerated        Niels Glover, OT

## 2022-07-06 NOTE — PROGRESS NOTES
[facility]  Occupational Therapy Treatment Note    Date: 7/8/2022  Name: Maninder Schneider  Clinic Number: [id number]    Therapy Diagnosis:   Encounter Diagnoses   Name Primary?    Pain Yes    Decreased strength      Physician: Maximo Mcallister MD    Physician Orders: Evaluate and treat   Medical Diagnosis: G56.21 (ICD-10-CM) - Cubital tunnel syndrome on right   Surgical Procedure and Date: N/A , N/A   Evaluation Date: 5/6/2022    Insurance Authorization Period Expiration: 04/25/2022 04/25/2023   Plan of Care Certification Period: 5/10/2022 - 7/29/2022   Progress Note Due: 6/17/2022   Date of Return to MD: none at this time   Visit # / Visits authorized: 8/20 (plus initial evaluation)   FOTO: 1/3    Initial= 45% / Current (7/8/2022) =43% /Goal= 35%     Precautions:  Standard      Time In: 10:00    Time Out: 10:45  Total Billable Time: 45 minutes (3TA)   Billing: Medicaid     SUBJECTIVE     Pt reports: She has tried wearing elbow brace at night again and reported that she continues to have ulnar sided hand pain with wear.  She also reports that she tried applying hand based splint with elbow brace, but had ulnar sided hand pain as well due to brace contact on ulnar side of hand/and resting of ulnar side of hand.  Maninder reports that she was unable to alleviate hand pain with pillow positioning. She also continues to report hand pain with writing and today reports pain use of chopsticks. She also reports pain in her thenar musculature with use of chopsticks.            She was compliant with home exercise program given last session; only had time to use blue sponge for lumbrical strengthening this past week      Response to previous treatment: Patient reported there may be a decrease in the consistency of pain, with it not occurring as often with the exception of  handwriting/computer use for longer periods of time   Functional change: none reported     Pain: 3-4/10 with writing/computer use    Location: right hand      OBJECTIVE     Objective Measures updated at progress report unless specified.    Treatment     Maninder received the treatments listed below:     Supervised modalities after being cleared for contradictions:   Paraffin bath - Paraffin w/ MHP to right hand for 10 min, pre-tx to decrease pain & increase tissue extensibility       Manual therapy techniques: Joint mobilizations and Soft tissue Mobilization were applied to the: right hand/forearm for 25 minutes, including:   -light joint mobilization to metacarpals to decrease tissue adhesions and pain   -soft tissue mobilization of hypothenar muscle bulk/palmar fascia/ulnar wrist using hand techniques and IASTM tool to decrease adhesions and pain, and increase circulation and tissue extensibility   -soft tissue mobilization using cupping, IASTM tools to right volar/dorsal forearm and lateral/medial/anterior/posterior elbow to decrease adhesions and pain, and increase circulation and tissue extensibility.  Patient placed in supine position 7/8/2002 for all soft tissue management.           Therapeutic exercises to develop strength, endurance and ROM for 10 minutes, including:  *utilized padding on table with some exercises to stabilize MCP joint in more neutral position.       Assisted ulnar nerve glide at wrist with SF/RF MCP stabilization  5 reps    Ulnar nerve glide at elbow 10 reps performed supine on mat today 7/8/2022   Supine with wrist flex extension ulnar nerve glide  10 reps performed supine on mat today 7/8/2022   Seated lat stretch on mat, alternating elbows straight/flexed  2 min, alternating elbow positions every 30 sec    Wall glides  20 reps                Therapeutic activities to improve functional performance for 0 minutes, including:  *First 3 below performed with wear of hand orthoses.   Positioning for sleep with elbow brace donned in supine and side-lying positions    Observation of pencil  with writing                      Maninder participated in Orthotic fitting and training for 50 minutes including the following: ()  A custom hand based ulnar gutter orthosis (with palmar stability piece placed volarlly and padding at 5th MCP joint to place MC head more dorsally/more neutral position) was fabricated today to address pain and instability in patient's right hand 5th metacarpal palmar area.  Good fit and positioning was achieved upon completion of fabrication.  Patient was provided with instruct on purpose of orthosis, proper donning/doffing, wear/care schedule and precautions to monitor.  Patient verbalized understanding of all instruction provided.     Orthotic Fitting: 10 min 6/3/2022  Modified splint to allow increased thumb movement across palm/opposition.  Patient reported increase in comfort and thumb range of motion following modification.  Will continue to monitor and adjust as needed.     *Patient reporting good fit, no modifications made 6/10/2022.     Ortho fittin min 2022  Measured and adjusted metal bar of elbow flexion limiting brace to 30 degrees of elbow flexion to allow resting of ulnar nerve during night time wear.  Reviewed with patient proper donning technique of elbow brace.  Patient demonstrated understanding.       Patient Education and Home Exercises      Education provided:  - issued pink sponge for lumbrical strengthening 2022  - Positioning with pillows for increased comfort in supine and side-lying with wear of elbow brace at night time    - recommend purchase elbow brace with adjustable flexbar and open posterior elbow for nightwear to assist with decreasing ulnar nerve symptoms   - purpose of orthosis, proper donning/doffing, wear/care schedule and precautions to monitor   - ulnar nerve glides   - use of wrapped towel around elbow at night to decrease elbow flexion and ease cubital tunnel symptoms    - HEP issued   - Progress towards goals     Written Home Exercises Provided: Patient  instructed to cont prior HEP.  Exercises were reviewed and Maninder was able to demonstrate them prior to the end of the session.  Maninder demonstrated good  understanding of the HEP provided. See EMR under Patient Instructions for exercises provided during therapy sessions.       Assessment   Maninder Schneider is a 27 y.o. female referred to outpatient occupational therapy and presents with a medical diagnosis of G56.21 (ICD-10-CM) - Cubital tunnel syndrome on right.  Patient presents with the following therapy deficits: Decreased  strength, Decreased muscle strength, Decreased functional hand use and Increased pain and demonstrates limitations as described in the chart below. Following medical record review it is determined that pt will benefit from occupational therapy services in order to maximize pain free and/or functional use of right hand.  Therapy focused on soft tissue mobilization today; was performed supine on mat.  Patient had positive Tinel's at medial elbow and complained of tingling in MF/RF/SF with elbow flexion X 1 min. She also had complaints of increased tenderness with IASTM and cupping over medial elbow just proximal to elbow.  Soft tissue in this area was noted to be mildly dense in comparison to surrounding tissue.  Patient was also tender to palpation in this area. Encouraged patient to continue with nerve glides and finger strengthening at home.         Maninder is progressing well towards her goals and there are no updates to goals at this time. Pt prognosis is Fair.     Pt will continue to benefit from skilled outpatient occupational therapy to address the deficits listed in the problem list on initial evaluation provide pt/family education and to maximize pt's level of independence in the home and community environment.     Pt's spiritual, cultural and educational needs considered and pt agreeable to plan of care and goals.    Anticipated barriers to occupational therapy: compliance with HEP,  therapy attendance       Goals:   Short Term Goals: (in 6 weeks)  1) Patient will be independent in HEP -Met 6/24/2022   2) Decrease pain in right hand to no more than 3-4/10 worst for increased functioning in ADL/IADL's -Progressing  3) Increase strength in SF right hand by 1/2 muscle grade for increased functioning in ADL/IADL's -Progressing  4) Increase right hand  strength by 10% or original measures for increased functional use with ADLs/IADLs -Progressing  5) Patient will tolerate light resistive gripping with right hand for 1 minute with rest breaks as needed for increased functional use with ADLs/IADLs -Progressing          Long Term Goals: (in 12 weeks)  1) Decrease pain in right hand to no more than 1-2/10 worst in ADL/IADL's  2) Increase strength in SF right hand by 1 muscle grade for increased functioning in ADL/IADL's  3) Increase right hand  strength by 15% or original measures for increased functional use with ADLs/IADLs  4) Increased functioning in ADL/IADL's, as evidenced by a FOTO impairment rating of no more than 35%  5) Patient will tolerate light resistive gripping with right hand for 1 minute with no rest breaks for increased functional use with ADLs/IADLs      PLAN     Plan of Care Certification: 5/6/2022 to 7/29/2022.      Outpatient Occupational Therapy 2 times weekly for 10 weeks to include the following interventions: Paraffin, Fluidotherapy, Modalities for pain management, Therapeutic exercises/activities., Strengthening, Orthotic Fabrication/Fit/Training and Joint Protection.       Updates/Grading for next session:  continue strengthening of intrinsic/extrinsic hand musculature, nerve glides, progress with light strengthening as tolerated        Niels Glover, OT

## 2022-07-08 ENCOUNTER — CLINICAL SUPPORT (OUTPATIENT)
Dept: REHABILITATION | Facility: HOSPITAL | Age: 27
End: 2022-07-08
Attending: STUDENT IN AN ORGANIZED HEALTH CARE EDUCATION/TRAINING PROGRAM
Payer: MEDICAID

## 2022-07-08 DIAGNOSIS — R52 PAIN: Primary | ICD-10-CM

## 2022-07-08 DIAGNOSIS — R53.1 DECREASED STRENGTH: ICD-10-CM

## 2022-07-08 PROCEDURE — 97530 THERAPEUTIC ACTIVITIES: CPT | Mod: PN

## 2022-07-22 ENCOUNTER — CLINICAL SUPPORT (OUTPATIENT)
Dept: REHABILITATION | Facility: HOSPITAL | Age: 27
End: 2022-07-22
Attending: STUDENT IN AN ORGANIZED HEALTH CARE EDUCATION/TRAINING PROGRAM
Payer: MEDICAID

## 2022-07-22 DIAGNOSIS — R52 PAIN: Primary | ICD-10-CM

## 2022-07-22 DIAGNOSIS — R53.1 DECREASED STRENGTH: ICD-10-CM

## 2022-07-22 PROCEDURE — 97530 THERAPEUTIC ACTIVITIES: CPT | Mod: PN

## 2022-07-22 NOTE — PROGRESS NOTES
[facility] OUTPATIENT THERAPY AND WELLNESS  Occupational Therapy Treatment Note    Date: 7/22/2022  Name: Maninder Schneider  Clinic Number: [id number]    Therapy Diagnosis:   Encounter Diagnoses   Name Primary?    Pain Yes    Decreased strength      Physician: Maximo Mcallister MD    Physician Orders: Evaluate and treat   Medical Diagnosis: G56.21 (ICD-10-CM) - Cubital tunnel syndrome on right   Surgical Procedure and Date: N/A , N/A   Evaluation Date: 5/6/2022    Insurance Authorization Period Expiration: 04/25/2022 04/25/2023   Plan of Care Certification Period: 5/10/2022 - 7/29/2022   Progress Note Due: 6/17/2022   Date of Return to MD: none at this time   Visit # / Visits authorized: 10/20 (plus initial evaluation)   FOTO: 1/3    Initial= 45% / Current (7/8/2022) =43% /Goal= 35%     Precautions:  Standard      Time In: 9:15    Time Out: 10:00  Total Billable Time: 45 minutes (3TA)   Billing: Medicaid     SUBJECTIVE     Pt reports: She reports having increased hand pain for 3 days this week.  She reports that she had been having increased hand pain maybe one day per week.  She describes as needle like and sharp.  Maninder reports there was no increase in activity which may have attributed to increased pain.       She was compliant with home exercise program given last session; performing exercises at work as her schedule allows       Response to previous treatment: minimal soreness following soft tissue intervention   Functional change: none reported     Pain: 3-4/10   Location: right hand      OBJECTIVE     Objective Measures updated at progress report unless specified.    Treatment     Maninder received the treatments listed below:     Supervised modalities after being cleared for contradictions:         Manual therapy techniques: Joint mobilizations and Soft tissue Mobilization were applied to the: right hand/forearm for 30 minutes, including:   -light joint mobilization to metacarpals to decrease tissue adhesions and  pain   -soft tissue mobilization of hypothenar muscle bulk/palmar fascia/ulnar wrist using hand techniques and IASTM tool to decrease adhesions and pain, and increase circulation and tissue extensibility   -soft tissue mobilization using cupping, IASTM tools to right volar/dorsal forearm and lateral/medial/anterior/posterior elbow to decrease adhesions and pain, and increase circulation and tissue extensibility.  Patient placed in supine position 7/22/2002 for all soft tissue management.    - Kinesiotaping: I striped applied to right dorsal 1/3 of upper arm, crossing medial elbow, to proximal 1/3 of volar forearm to assist elbow extended position to  decrease tension on ulnar nerve  - Kinesiotaping: I strip applied to ulnar side of hand at lateral side of hypothenar muscles, I strip placed at Guyons canal for space correction and to possibly decrease ulnar nerve irration/pain at SF.  I strip placed dorsal hand from SF MCP to mid dorsal hand to assist with decreasing SF rotation with composite fisting.       Therapeutic exercises to develop strength, endurance and ROM for 10 minutes, including:  *utilized padding on table with some exercises to stabilize MCP joint in more neutral position.       Assisted ulnar nerve glide at wrist with SF/RF MCP stabilization  5 reps    Ulnar nerve glide at elbow 10 reps performed supine on mat today 7/8/2022   Supine with wrist flex extension ulnar nerve glide  10 reps performed supine on mat today 7/8/2022               Therapeutic activities to improve functional performance for 0 minutes, including:  *First 3 below performed with wear of hand orthoses.   Positioning for sleep with elbow brace donned in supine and side-lying positions    Observation of pencil  with writing                     Maninder participated in Orthotic fitting and training for 50 minutes including the following: ()  A custom hand based ulnar gutter orthosis (with palmar stability piece placed  volarlly and padding at 5th MCP joint to place MC head more dorsally/more neutral position) was fabricated today to address pain and instability in patient's right hand 5th metacarpal palmar area.  Good fit and positioning was achieved upon completion of fabrication.  Patient was provided with instruct on purpose of orthosis, proper donning/doffing, wear/care schedule and precautions to monitor.  Patient verbalized understanding of all instruction provided.     Orthotic Fitting: 10 min 6/3/2022  Modified splint to allow increased thumb movement across palm/opposition.  Patient reported increase in comfort and thumb range of motion following modification.  Will continue to monitor and adjust as needed.     *Patient reporting good fit, no modifications made 6/10/2022.     Ortho fittin min 2022  Measured and adjusted metal bar of elbow flexion limiting brace to 30 degrees of elbow flexion to allow resting of ulnar nerve during night time wear.  Reviewed with patient proper donning technique of elbow brace.  Patient demonstrated understanding.       Patient Education and Home Exercises      Education provided:  - issued pink sponge for lumbrical strengthening 2022  - Positioning with pillows for increased comfort in supine and side-lying with wear of elbow brace at night time    - recommend purchase elbow brace with adjustable flexbar and open posterior elbow for nightwear to assist with decreasing ulnar nerve symptoms   - purpose of orthosis, proper donning/doffing, wear/care schedule and precautions to monitor   - ulnar nerve glides   - use of wrapped towel around elbow at night to decrease elbow flexion and ease cubital tunnel symptoms    - HEP issued   - Progress towards goals     Written Home Exercises Provided: Patient instructed to cont prior HEP.  Exercises were reviewed and Maninder was able to demonstrate them prior to the end of the session.  Maninder demonstrated good  understanding of the HEP  provided. See EMR under Patient Instructions for exercises provided during therapy sessions.       Assessment   Maninder Schneider is a 27 y.o. female referred to outpatient occupational therapy and presents with a medical diagnosis of G56.21 (ICD-10-CM) - Cubital tunnel syndrome on right.  Patient presents with the following therapy deficits: Decreased  strength, Decreased muscle strength, Decreased functional hand use and Increased pain and demonstrates limitations as described in the chart below. Following medical record review it is determined that pt will benefit from occupational therapy services in order to maximize pain free and/or functional use of right hand.  Therapy focused on soft tissue mobilization of right upper extremity from wrist level to above elbow (along ulnar nerve track); was performed supine on mat.  She also had complaints of increased tenderness with IASTM and cupping over medial volar elbow just lateral to biceps muscle belly proximal to elbow.  No tissue density noted in this area.  Kinesiotaping was applied to assist with elbow extension at elbow and for space correction at hand.  Patient instructed on purpose, wear, care, precautions to monitor and removal of KT. Patient verbalized understanding of all instructions provided.  Will determine effectiveness of Kinesiotaping intervention at next session per patient report.  Encouraged patient to continue with nerve glides and finger strengthening at home.         Maninder is progressing well towards her goals and there are no updates to goals at this time. Pt prognosis is Fair.     Pt will continue to benefit from skilled outpatient occupational therapy to address the deficits listed in the problem list on initial evaluation provide pt/family education and to maximize pt's level of independence in the home and community environment.     Pt's spiritual, cultural and educational needs considered and pt agreeable to plan of care and  goals.    Anticipated barriers to occupational therapy: compliance with HEP, therapy attendance       Goals:   Short Term Goals: (in 6 weeks)  1) Patient will be independent in HEP -Met 6/24/2022   2) Decrease pain in right hand to no more than 3-4/10 worst for increased functioning in ADL/IADL's -Progressing  3) Increase strength in SF right hand by 1/2 muscle grade for increased functioning in ADL/IADL's -Progressing  4) Increase right hand  strength by 10% or original measures for increased functional use with ADLs/IADLs -Progressing  5) Patient will tolerate light resistive gripping with right hand for 1 minute with rest breaks as needed for increased functional use with ADLs/IADLs -Progressing          Long Term Goals: (in 12 weeks)  1) Decrease pain in right hand to no more than 1-2/10 worst in ADL/IADL's  2) Increase strength in SF right hand by 1 muscle grade for increased functioning in ADL/IADL's  3) Increase right hand  strength by 15% or original measures for increased functional use with ADLs/IADLs  4) Increased functioning in ADL/IADL's, as evidenced by a FOTO impairment rating of no more than 35%  5) Patient will tolerate light resistive gripping with right hand for 1 minute with no rest breaks for increased functional use with ADLs/IADLs      PLAN     Plan of Care Certification: 5/6/2022 to 7/29/2022.      Outpatient Occupational Therapy 2 times weekly for 10 weeks to include the following interventions: Paraffin, Fluidotherapy, Modalities for pain management, Therapeutic exercises/activities., Strengthening, Orthotic Fabrication/Fit/Training and Joint Protection.       Updates/Grading for next session:  continue strengthening of intrinsic/extrinsic hand musculature, nerve glides, progress with light strengthening as tolerated        Niels Glover, OT

## 2022-08-01 ENCOUNTER — CLINICAL SUPPORT (OUTPATIENT)
Dept: REHABILITATION | Facility: HOSPITAL | Age: 27
End: 2022-08-01
Attending: STUDENT IN AN ORGANIZED HEALTH CARE EDUCATION/TRAINING PROGRAM
Payer: MEDICAID

## 2022-08-01 DIAGNOSIS — R52 PAIN: Primary | ICD-10-CM

## 2022-08-01 DIAGNOSIS — R53.1 DECREASED STRENGTH: ICD-10-CM

## 2022-08-01 PROCEDURE — 97530 THERAPEUTIC ACTIVITIES: CPT | Mod: PN

## 2022-08-01 NOTE — PROGRESS NOTES
"  [facility]  Occupational Therapy Treatment Note    Date: 8/1/2022  Name: Maninder Schneider  Clinic Number: [id number]    Therapy Diagnosis:   Encounter Diagnoses   Name Primary?    Pain Yes    Decreased strength      Physician: Maximo Mcallister MD    Physician Orders: Evaluate and treat   Medical Diagnosis: G56.21 (ICD-10-CM) - Cubital tunnel syndrome on right   Surgical Procedure and Date: N/A , N/A   Evaluation Date: 5/6/2022    Insurance Authorization Period Expiration: 04/25/2022 04/25/2023   Plan of Care Certification Period: 5/10/2022 - 7/29/2022   Progress Note Due: 6/17/2022   Date of Return to MD: none at this time   Visit # / Visits authorized: 10/20 (plus initial evaluation)   FOTO: 1/3    Initial= 45% / Current (7/8/2022) =43% /Goal= 35%     Precautions:  Standard      Time In: 9:15 am  Time Out: 10:00 am  Total Billable Time: 45 minutes (3TA)   Billing: Medicaid     SUBJECTIVE     Pt reports: She reports having decreased frequency of hand pain this week; she does have continued hand pain with writing/drawing and use of chopsticks.  She also reports decreased elbow pain following Kinesiotaping last session; reports as 1-2/10  "dull ache"  with less consistency.       She was compliant with home exercise program given last session; performing exercises at work as her schedule allows; currently not wearing elbow brace at night due to poor tolerance      Response to previous treatment: minimal soreness following soft tissue intervention   Functional change: none reported     Pain: 3-4/10 hand; 1-2/10 elbow    Location: right hand      OBJECTIVE       Objective Measures updated today. 8/1/2022.  Please see progress report.        Treatment     Maninder received the treatments listed below:     Supervised modalities after being cleared for contradictions:         Manual therapy techniques: Joint mobilizations and Soft tissue Mobilization were applied to the: right hand/forearm for 30 " minutes, including:   -light joint mobilization to metacarpals to decrease tissue adhesions and pain   -soft tissue mobilization of hypothenar muscle bulk/palmar fascia/ulnar wrist using hand techniques and IASTM tool to decrease adhesions and pain, and increase circulation and tissue extensibility   -soft tissue mobilization using cupping, IASTM tools to right volar/dorsal forearm and lateral/medial/anterior/posterior elbow to decrease adhesions and pain, and increase circulation and tissue extensibility.  Patient placed in supine position soft tissue management.    - Kinesiotaping: I striped applied to right dorsal 1/3 of upper arm, crossing medial elbow, to proximal 1/3 of volar forearm to assist elbow extended position to  decrease tension on ulnar nerve  -    Therapeutic exercises to develop strength, endurance and ROM for 10 minutes, including:  *utilized padding on table with some exercises to stabilize MCP joint in more neutral position.   *Objective measures taken for progress note 8/1/2022.       Assisted ulnar nerve glide at wrist with SF/RF MCP stabilization  5 reps                Therapeutic activities to improve functional performance for 0 minutes, including:  *First 3 below performed with wear of hand orthoses.   Grasp 3# weight and place on overhead shelf  8 times    Pour 1/2 full pitcher of water out into sink  1 time   Writing with pencil (no )     Positioning for sleep with elbow brace donned in supine and side-lying positions    Observation of pencil  with writing                     Maninder participated in Orthotic fitting and training for 50 minutes including the following: ()  A custom hand based ulnar gutter orthosis (with palmar stability piece placed volarlly and padding at 5th MCP joint to place MC head more dorsally/more neutral position) was fabricated today to address pain and instability in patient's right hand 5th metacarpal palmar area.  Good fit and positioning was  achieved upon completion of fabrication.  Patient was provided with instruct on purpose of orthosis, proper donning/doffing, wear/care schedule and precautions to monitor.  Patient verbalized understanding of all instruction provided.     Orthotic Fitting: 10 min 6/3/2022  Modified splint to allow increased thumb movement across palm/opposition.  Patient reported increase in comfort and thumb range of motion following modification.  Will continue to monitor and adjust as needed.     *Patient reporting good fit, no modifications made 6/10/2022.     Ortho fittin min 2022  Measured and adjusted metal bar of elbow flexion limiting brace to 30 degrees of elbow flexion to allow resting of ulnar nerve during night time wear.  Reviewed with patient proper donning technique of elbow brace.  Patient demonstrated understanding.       Patient Education and Home Exercises      Education provided: (5 Minutes)  - Education on kinesiotaping technique for cubital tunnel 2022  - issued pink sponge for lumbrical strengthening 2022  - Positioning with pillows for increased comfort in supine and side-lying with wear of elbow brace at night time    - recommend purchase elbow brace with adjustable flexbar and open posterior elbow for nightwear to assist with decreasing ulnar nerve symptoms   - purpose of orthosis, proper donning/doffing, wear/care schedule and precautions to monitor   - ulnar nerve glides   - use of wrapped towel around elbow at night to decrease elbow flexion and ease cubital tunnel symptoms    - HEP issued   - Progress towards goals     Written Home Exercises Provided: Patient instructed to cont prior HEP.  Exercises were reviewed and Maninder was able to demonstrate them prior to the end of the session.  Maninder demonstrated good  understanding of the HEP provided. See EMR under Patient Instructions for exercises provided during therapy sessions.       Assessment   Maninder Schneider is a 27 y.o. female  referred to outpatient occupational therapy and presents with a medical diagnosis of G56.21 (ICD-10-CM) - Cubital tunnel syndrome on right.  Patient presents with the following therapy deficits: Decreased  strength, Decreased muscle strength, Decreased functional hand use and Increased pain. Therapy continued to focus on soft tissue management of right upper extremity from wrist level to above elbow (along ulnar nerve track); was performed supine on mat.  Patient demonstrating increased tolerance to soft tissue management of medial elbow.  Maninder reported that Kinesiotaping was helpful in decreasing elbow pain last session.  She was educated on application of Kinesiotaping to medial elbow for cubital tunnel syndrome.  Patient instructed on purpose, wear, care, precautions to monitor and removal of KT. Patient verbalized understanding of all instructions provided.  Will determine effectiveness of Kinesiotaping intervention at next session per patient report.  Encouraged patient to continue with nerve glides and finger strengthening at home.         Maninder is progressing well towards her goals and there are no updates to goals at this time. Pt prognosis is Fair.     Pt will continue to benefit from skilled outpatient occupational therapy to address the deficits listed in the problem list on initial evaluation provide pt/family education and to maximize pt's level of independence in the home and community environment.     Pt's spiritual, cultural and educational needs considered and pt agreeable to plan of care and goals.    Anticipated barriers to occupational therapy: compliance with HEP, therapy attendance       Goals:   Short Term Goals: (in 6 weeks)  1) Patient will be independent in HEP -Met 6/24/2022   2) Decrease pain in right hand to no more than 3-4/10 worst for increased functioning in ADL/IADL's -Progressing, not met   3) Increase strength in SF right hand by 1/2 muscle grade for increased functioning  in ADL/IADL's -Met 8/1/2022  4) Increase right hand  strength by 10% or original measures for increased functional use with ADLs/IADLs -Progressing, not met   5) Patient will tolerate light resistive gripping with right hand for 1 minute with rest breaks as needed for increased functional use with ADLs/IADLs -Progressing          Long Term Goals: (in 12 weeks)  1) Decrease pain in right hand to no more than 1-2/10 worst in ADL/IADL's -Progressing, not met   2) Increase strength in SF right hand by 1 muscle grade for increased functioning in ADL/IADL'sMet 8/1/2022  3) Increase right hand  strength by 15% or original measures for increased functional use with ADLs/IADLs -Progressing  4) Increased functioning in ADL/IADL's, as evidenced by a FOTO impairment rating of no more than 35% -Progressing   5) Patient will tolerate light resistive gripping with right hand for 1 minute with no rest breaks for increased functional use with ADLs/IADLs -Progressing        PLAN     Plan of Care Certification: 5/6/2022 to 7/29/2022, extend to 8/22/2022      Outpatient Occupational Therapy 2 times weekly for 10 weeks to include the following interventions: Paraffin, Fluidotherapy, Modalities for pain management, Therapeutic exercises/activities., Strengthening, Orthotic Fabrication/Fit/Training and Joint Protection.       Updates/Grading for next session:  continue strengthening of intrinsic/extrinsic hand musculature, nerve glides, progress with light strengthening as tolerated        Niels Glover, OT

## 2022-08-02 NOTE — PLAN OF CARE
"  Outpatient Therapy Updated Plan of Care     Visit Date: 8/1/2022  Name: Maninder Schneider  Clinic Number: [id number]    Therapy Diagnosis:   Encounter Diagnoses   Name Primary?    Pain Yes    Decreased strength      Physician: Maximo Mcallister MD    Physician Orders: Evaluate and treat   Medical Diagnosis: G56.21 (ICD-10-CM) - Cubital tunnel syndrome on right   Evaluation Date: 5/6/2022        Total Visits Received: 11  Cancelled Visits: 0  No Show Visits: 0    Current Certification Period:  5/6/2022 to 7/29/2022  Precautions:  Standard    Visits from Evaluation Date:  10  Functional Level Prior to Evaluation:  Independent    Subjective     Patient reports having decreased frequency of hand pain this week; she does have continued hand pain with writing/drawing and use of chopsticks.  She also reports decreased elbow pain following Kinesiotaping last session; reports as 1-2/10  "dull ache"  with less consistency.        Objective       Hand ROM. Measured in degrees.    5/10/2022    5/10/2022    8/1/2022     Left Right Right             Thumb:                Opposition WNL  WFL +no pain  WFL             Fisting:        Composite  WNL  WNL  WNL    Hook  WNL  WNL  WNL    Flat  WNL  WNL  WNL       8/1/2022:   -Opposition- patient reports no sensation of pulling over SF MCP joint with opposition; improvement from initial evaluation.    -5th digit MCP joint anterior subluxation decreased at rest and with active fisting.  Patient is able to control rotation of SF with active gripping.  SF rotation continues to be more noteable with finger relaxation.    -Fisting is WFL, SF metacarpal head volar translation still present, but decreased from initial evaluation          Active Range of Motion: Measured in degrees     Wrist Left Right  Right 8/1/2022     AROM AROM  AROM   Flexion WNL    WNL  WNL    Extension " " "   Radial Deviation " " "   Ulnar Deviation " " "   Supination " " "   Pronation " " "          Elbow Left  Right  "   Flexion WNL    WNL    WNL   Extension WNL  WNL  WNL                 Manual Muscle Testing     Action Left  Action  Right  Right   8/1/2022   Elbow Extension 4+/5 Elbow Extension 4+/5  4+/5   Elbow Flexion 5/5 Elbow Flexion 5/5 5/5   Forearm Supination 5/5 Forearm Supination 5/5 5/5   Forearm Pronation 5/5 Forearm Pronation 5/5 5/5   Wrist Extension  5/5 Wrist Extension  5/5 5/5   Wrist Flexion 5/5 Wrist Flexion 5/5 5/5   Wrist Radial Deviation 5/5 Wrist Radial Deviation 5/5 5/5   Wrist Ulnar Deviation 5/5 Wrist Ulnar Deviation 5/5 5/5        Manual Muscle Testing     Action Right Right   8/1/2022   SF Flexion FDS/FDP 3+/5   4-/5   Opposition to tip of SF   3+/5  4-/5   SF abduction/adduction 3+/5   4-/5          Strength: (FRANKLIN Dynamometer in lbs.) Average 3 trials, Position II:       5/6/2022 8/2/2022 5/6/2022 8/2/2022  *No pain     Left Left  Right Right    Elbow Flexed 64# 61# 53# 59#   Elbow Extended NT  NT  NT  NT          8/1/2022-Palpation:   Patient reports no tenderness with palpation at:    -Dorsal SF MCP tenderness   -Volar palm over hypothenar eminence along 5th metacarpal   *She reports that she has ulnar sided tenderness in hand         Assessment   Patient is seen once per week due to work schedule; she has not been able to tolerate night-time wear of elbow flexion limiting brace due to complaints of hand pain with wear.  She has made improvements towards SF strength and right hand  strength at this time.  There is decreased mobility of metacarpal head with fisting/opposition, possibly due to strengthening of intrinsic hand musculature.  She reports continued hand and elbow pain, but with decreased intensity and frequency.  Maninder is reporting that Kinesiotaping has been helpful in decreasing elbow pain at last session.  At this time, OT recommends a maximum of 2-3 visits to continue with right upper extremity soft tissue management and right hand strengthening.         Previous Short Term  Goals Status:   2/5 Met   New Short Term Goals Status:   Continue with current STGs  Long Term Goal Status:   continue per initial plan of care.  Reasons for Recertification of Therapy:   Updated progress note     Goals:   Short Term Goals: (in 6 weeks)  1) Patient will be independent in HEP -Met 6/24/2022   2) Decrease pain in right hand to no more than 3-4/10 worst for increased functioning in ADL/IADL's -Progressing, not met   3) Increase strength in SF right hand by 1/2 muscle grade for increased functioning in ADL/IADL's -Met 8/1/2022  4) Increase right hand  strength by 10% or original measures for increased functional use with ADLs/IADLs -Progressing, not met   5) Patient will tolerate light resistive gripping with right hand for 1 minute with rest breaks as needed for increased functional use with ADLs/IADLs -Progressing          Long Term Goals: (in 12 weeks)  1) Decrease pain in right hand to no more than 1-2/10 worst in ADL/IADL's -Progressing, not met   2) Increase strength in SF right hand by 1 muscle grade for increased functioning in ADL/IADL'sMet 8/1/2022  3) Increase right hand  strength by 15% or original measures for increased functional use with ADLs/IADLs -Progressing  4) Increased functioning in ADL/IADL's, as evidenced by a FOTO impairment rating of no more than 35% -Progressing   5) Patient will tolerate light resistive gripping with right hand for 1 minute with no rest breaks for increased functional use with ADLs/IADLs -Progressing          Plan     Updated Certification Period: 8/1/2022 to 8/22/2022  Recommended Treatment Plan: 1 times per week for 3 weeks: Manual Therapy, Orthotic Management and Training, Paraffin, Therapeutic Activities and Therapeutic Exercise  Other Recommendations: Recommend maximum of 2-3 weeks of occupational therapy to continue strengthening of right hand as tolerated.      Niels Glover OT  8/1/2022      I CERTIFY THE NEED FOR THESE SERVICES FURNISHED  UNDER THIS PLAN OF TREATMENT AND WHILE UNDER MY CARE    Physician's comments:        Physician's Signature: ___________________________________________________

## 2022-08-15 ENCOUNTER — CLINICAL SUPPORT (OUTPATIENT)
Dept: REHABILITATION | Facility: HOSPITAL | Age: 27
End: 2022-08-15
Attending: STUDENT IN AN ORGANIZED HEALTH CARE EDUCATION/TRAINING PROGRAM
Payer: MEDICAID

## 2022-08-15 DIAGNOSIS — R52 PAIN: Primary | ICD-10-CM

## 2022-08-15 DIAGNOSIS — R53.1 DECREASED STRENGTH: ICD-10-CM

## 2022-08-15 PROCEDURE — 97530 THERAPEUTIC ACTIVITIES: CPT | Mod: PN

## 2022-08-15 NOTE — PATIENT INSTRUCTIONS
Issued Via [email or web address] (see logo above) scan QR code for pt specific program          View at [email or web address] using code: [email or web address]

## 2022-08-15 NOTE — PROGRESS NOTES
[facility]  Occupational Therapy Treatment Note    Date: 8/15/2022  Name: Maninder Schneider  Clinic Number: [id number]    Therapy Diagnosis:   Encounter Diagnoses   Name Primary?    Pain Yes    Decreased strength      Physician: Maximo Mcallister MD    Physician Orders: Evaluate and treat   Medical Diagnosis: G56.21 (ICD-10-CM) - Cubital tunnel syndrome on right   Surgical Procedure and Date: N/A , N/A   Evaluation Date: 5/6/2022    Insurance Authorization Period Expiration: 04/25/2022 04/25/2023   Plan of Care Certification Period: 5/10/2022 - 7/29/2022   Progress Note Due: 6/17/2022   Date of Return to MD: none at this time   Visit # / Visits authorized: 11/20 (plus initial evaluation)   FOTO: 1/3    Initial= 45% / Current (7/8/2022) =43% /Goal= 35%     Precautions:  Standard      Time In: 10:50 am   Time Out: 11:35 am  Total Billable Time: 45 minutes (3TA)   Billing: Medicaid     SUBJECTIVE     Pt reports: She reports having general soreness/achiness in hand as 3/10 this past week. Maninder has been busier than usual at work and was unable to be consistent with HEP.  She reports that kinesiotaping is helpful with decreasing elbow pain and has ordered some.      She was compliant with home exercise program given last session; performing exercises at work as her schedule allows; currently not wearing elbow brace at night due to poor tolerance     Response to previous treatment: kinesiotaping helps decrease elbow pain   Functional change: none reported     Pain: 2-3/10 hand; 1/10 elbow    Location: right hand      OBJECTIVE       Objective Measures updated today. 8/1/2022.  Please see progress report.        Treatment     Maninder received the treatments listed below:     Supervised modalities after being cleared for contradictions:         Manual therapy techniques: Joint mobilizations and Soft tissue Mobilization were applied to the: right hand/forearm for 5 minutes, including:   --  Kinesiotaping: I striped applied to right dorsal 1/3 of upper arm, crossing medial elbow, to proximal 1/3 of volar forearm to assist elbow extended position to  decrease tension on ulnar nerve  -    Therapeutic exercises to develop strength, endurance and ROM for 35 minutes, including:  *utilized padding on table with some exercises to stabilize MCP joint in more neutral position.   *Objective measures taken for progress note 8/1/2022.   Finger abduction/adduction-all digits, yellow rubberband 20 reps    Isolated SF/RF abduction/adduction, yellow rubberband  20 reps each    Finger extension with light rubberband  10 reps SF MCP/PIP/DIP flexion,   yellow rubberband  15 reps each joint    Lumbrical , blue sponge,  10 reps with RF/SF only    Gripping finger flexion, blue sponge  10 reps Gripping blue sponge finger flexion RF/SF only 10 reps Assisted ulnar nerve glide at wrist with SF/RF MCP stabilization  5 reps    Ulnar nerve glide at elbow 10 reps performed supine on mat    Seated lat stretch on mat, alternating elbows straight/flexed  2 min, alternating elbow positions every 30 sec   (1 min, no alternating 8/15/2022)    Wall glides  30 reps                Therapeutic activities to improve functional performance for 0 minutes, including:  *First 3 below performed with wear of hand orthoses.   Grasp 3# weight and place on overhead shelf  8 times    Pour 1/2 full pitcher of water out into sink  1 time   Writing with pencil (no )     Positioning for sleep with elbow brace donned in supine and side-lying positions    Observation of pencil  with writing                     Maninder participated in Orthotic fitting and training for 50 minutes including the following: ()  A custom hand based ulnar gutter orthosis (with palmar stability piece placed volarlly and padding at 5th MCP joint to place MC head more dorsally/more neutral position) was fabricated today to address pain and instability in patient's right  hand 5th metacarpal palmar area.  Good fit and positioning was achieved upon completion of fabrication.  Patient was provided with instruct on purpose of orthosis, proper donning/doffing, wear/care schedule and precautions to monitor.  Patient verbalized understanding of all instruction provided.     Orthotic Fitting: 10 min 6/3/2022  Modified splint to allow increased thumb movement across palm/opposition.  Patient reported increase in comfort and thumb range of motion following modification.  Will continue to monitor and adjust as needed.     *Patient reporting good fit, no modifications made 6/10/2022.     Ortho fittin min 2022  Measured and adjusted metal bar of elbow flexion limiting brace to 30 degrees of elbow flexion to allow resting of ulnar nerve during night time wear.  Reviewed with patient proper donning technique of elbow brace.  Patient demonstrated understanding.       Ortho fittin min 8/15/2022   Patient had reports of increased rubbing/pressure over dorsal MCPs on radial and ulnar sides of splint.  Splint was modified for comfort.     Patient Education and Home Exercises      Education provided: (5 Minutes)  - review of HEP/printout issued 8/15/2022   - Education on kinesiotaping technique for cubital tunnel 2022  - issued pink sponge for lumbrical strengthening 2022  - Positioning with pillows for increased comfort in supine and side-lying with wear of elbow brace at night time    - recommend purchase elbow brace with adjustable flexbar and open posterior elbow for nightwear to assist with decreasing ulnar nerve symptoms   - purpose of orthosis, proper donning/doffing, wear/care schedule and precautions to monitor   - ulnar nerve glides   - use of wrapped towel around elbow at night to decrease elbow flexion and ease cubital tunnel symptoms    - HEP issued   - Progress towards goals     Written Home Exercises Provided: Patient instructed to cont prior HEP.  Exercises were  reviewed and Maninder was able to demonstrate them prior to the end of the session.  Maninder demonstrated good  understanding of the HEP provided. See EMR under Patient Instructions for exercises provided during therapy sessions.       Assessment   Maninder Schneider is a 27 y.o. female referred to outpatient occupational therapy and presents with a medical diagnosis of G56.21 (ICD-10-CM) - Cubital tunnel syndrome on right.  Patient presents with the following therapy deficits: Decreased  strength, Decreased muscle strength, Decreased functional hand use and Increased pain. Comprehensive HEP was issued and reviewed.  Patient tolerated well and was able to demonstrate understanding.  Maninder reported that Kinesiotaping was helpful in decreasing elbow pain; she has ordered some for use at home.   Encouraged patient to continue with nerve glides and finger strengthening at home.         Maninder is progressing well towards her goals and there are no updates to goals at this time. Pt prognosis is Fair.     Pt will continue to benefit from skilled outpatient occupational therapy to address the deficits listed in the problem list on initial evaluation provide pt/family education and to maximize pt's level of independence in the home and community environment.     Pt's spiritual, cultural and educational needs considered and pt agreeable to plan of care and goals.    Anticipated barriers to occupational therapy: compliance with HEP, therapy attendance       Goals:   Short Term Goals: (in 6 weeks)  1) Patient will be independent in HEP -Met 6/24/2022   2) Decrease pain in right hand to no more than 3-4/10 worst for increased functioning in ADL/IADL's -Progressing, not met   3) Increase strength in SF right hand by 1/2 muscle grade for increased functioning in ADL/IADL's -Met 8/1/2022  4) Increase right hand  strength by 10% or original measures for increased functional use with ADLs/IADLs -Progressing, not met   5) Patient will  tolerate light resistive gripping with right hand for 1 minute with rest breaks as needed for increased functional use with ADLs/IADLs -Progressing          Long Term Goals: (in 12 weeks)  1) Decrease pain in right hand to no more than 1-2/10 worst in ADL/IADL's -Progressing, not met   2) Increase strength in SF right hand by 1 muscle grade for increased functioning in ADL/IADL'sMet 8/1/2022  3) Increase right hand  strength by 15% or original measures for increased functional use with ADLs/IADLs -Progressing  4) Increased functioning in ADL/IADL's, as evidenced by a FOTO impairment rating of no more than 35% -Progressing   5) Patient will tolerate light resistive gripping with right hand for 1 minute with no rest breaks for increased functional use with ADLs/IADLs -Progressing        PLAN     Plan of Care Certification: 5/6/2022 to 7/29/2022, extend to 8/22/2022      Outpatient Occupational Therapy 2 times weekly for 10 weeks to include the following interventions: Paraffin, Fluidotherapy, Modalities for pain management, Therapeutic exercises/activities., Strengthening, Orthotic Fabrication/Fit/Training and Joint Protection.       Updates/Grading for next session:  continue strengthening of intrinsic/extrinsic hand musculature, nerve glides, progress with light strengthening as tolerated        Niels Glover, OT

## 2022-08-22 ENCOUNTER — CLINICAL SUPPORT (OUTPATIENT)
Dept: REHABILITATION | Facility: HOSPITAL | Age: 27
End: 2022-08-22
Attending: STUDENT IN AN ORGANIZED HEALTH CARE EDUCATION/TRAINING PROGRAM
Payer: MEDICAID

## 2022-08-22 DIAGNOSIS — R53.1 DECREASED STRENGTH: ICD-10-CM

## 2022-08-22 DIAGNOSIS — R52 PAIN: Primary | ICD-10-CM

## 2022-08-22 PROCEDURE — 97530 THERAPEUTIC ACTIVITIES: CPT | Mod: PN

## 2022-08-22 NOTE — PROGRESS NOTES
[facility] OUTPATIENT THERAPY AND WELLNESS  Occupational Therapy Treatment and Discharge Note    Date: 8/22/2022  Name: Maninder Schneider  Clinic Number: [id number]    Therapy Diagnosis:   Encounter Diagnoses   Name Primary?    Pain Yes    Decreased strength      Physician: Maximo Mcallister MD    Physician Orders: Evaluate and treat   Medical Diagnosis: G56.21 (ICD-10-CM) - Cubital tunnel syndrome on right   Surgical Procedure and Date: N/A , N/A   Evaluation Date: 5/6/2022    Insurance Authorization Period Expiration: 04/25/2022 04/25/2023   Plan of Care Certification Period: 5/10/2022 - 7/29/2022   Progress Note Due: 6/17/2022   Date of Return to MD: none at this time   Visit # / Visits authorized: 12/20 (plus initial evaluation)   FOTO: 1/3    Initial= 45% / Current (8/22/2022) =45% /Goal= 35%     Precautions:  Standard      Time In: 2:15 pm   Time Out: 3:00 pm  Total Billable Time: 45 minutes (3TA)   Billing: Medicaid     SUBJECTIVE     Pt reports: She reports having general soreness/achiness in hand as 4-5/10 this past week with increased work activity. She has no elbow pain.     She was compliant with home exercise program given last session; performing exercises at work as her schedule allows; currently not wearing elbow brace at night due to poor tolerance     Response to previous treatment: kinesiotaping helps decrease elbow pain   Functional change: none reported     Pain: 4-5/10 hand; 0/10 elbow    Location: right hand      OBJECTIVE        strength measures updated today, 8/22/2022.         Strength: (FRANKLIN Dynamometer in lbs.) Average 3 trials, Position II:       5/6/2022 8/2/2022 8/22/2022 5/6/2022 8/2/2022  *No pain 8/22/2022     Left Left  Left  Right Right  Right    Elbow Flexed 64# 61# 62# 53# 59# 60#   Elbow Extended NT  NT  NT  NT  NT  NT           Treatment     Maninder received the treatments listed below:     Supervised modalities after being cleared for contradictions:         Manual therapy  techniques: Joint mobilizations and Soft tissue Mobilization were applied to the: right hand/forearm for 15 minutes, including:   -light joint mobilization to metacarpals to decrease tissue adhesions and pain   -soft tissue mobilization of hypothenar muscle bulk/palmar fascia/ulnar wrist using hand techniques and IASTM tool to decrease adhesions and pain, and increase circulation and tissue extensibility   -soft tissue mobilization using cupping, IASTM tools to right volar/dorsal forearm and lateral/medial/anterior/posterior elbow to decrease adhesions and pain, and increase circulation and tissue extensibility.  Patient placed in supine position soft tissue management.    - -    Therapeutic exercises to develop strength, endurance and ROM for 30 minutes, including:  *utilized padding on table with some exercises to stabilize MCP joint in more neutral position.   *Objective measures taken for progress note 8/1/2022.   Finger abduction/adduction-all digits, yellow rubberband  20 reps     Isolated SF/RF abduction/adduction, yellow rubberband  20 reps each    Finger extension with light rubberband  15 reps SF MCP/PIP/DIP flexion,   yellow rubberband  15 reps each joint    Lumbrical , blue sponge,  Composite and RF/SF  10 reps with RF/SF only    Gripping finger flexion, blue sponge  10 reps Gripping blue sponge finger flexion RF/SF only 10 reps T-bar: tanSmiles/frowns 15 reps [id number] reps Assisted ulnar nerve glide at wrist with SF/RF MCP stabilization  10 reps    Ulnar nerve glide at elbow 10 reps performed supine on mat    Seated lat stretch on mat, alternating elbows straight/flexed  1 min static   1 min, alternating elbow positions every 30 sec        Wall glides  30 reps    Overhead tricep stretch with cane  10 reps            Therapeutic activities to improve functional performance for 0 minutes, including:  *First 3 below performed with wear of hand orthoses.   Grasp 3# weight and place on overhead shelf  8  times    Pour 1/2 full pitcher of water out into sink  1 time   Writing with pencil (no )     Positioning for sleep with elbow brace donned in supine and side-lying positions    Observation of pencil  with writing                     Maninder participated in Orthotic fitting and training for 50 minutes including the following: ()  A custom hand based ulnar gutter orthosis (with palmar stability piece placed volarlly and padding at 5th MCP joint to place MC head more dorsally/more neutral position) was fabricated today to address pain and instability in patient's right hand 5th metacarpal palmar area.  Good fit and positioning was achieved upon completion of fabrication.  Patient was provided with instruct on purpose of orthosis, proper donning/doffing, wear/care schedule and precautions to monitor.  Patient verbalized understanding of all instruction provided.     Orthotic Fitting: 10 min 6/3/2022  Modified splint to allow increased thumb movement across palm/opposition.  Patient reported increase in comfort and thumb range of motion following modification.  Will continue to monitor and adjust as needed.     *Patient reporting good fit, no modifications made 6/10/2022.     Ortho fittin min 2022  Measured and adjusted metal bar of elbow flexion limiting brace to 30 degrees of elbow flexion to allow resting of ulnar nerve during night time wear.  Reviewed with patient proper donning technique of elbow brace.  Patient demonstrated understanding.       Ortho fittin min 8/15/2022   Patient had reports of increased rubbing/pressure over dorsal MCPs on radial and ulnar sides of splint.  Splint was modified for comfort.     Patient Education and Home Exercises      Education provided: (5 Minutes)  - review of HEP/printout issued 8/15/2022   - Education on kinesiotaping technique for cubital tunnel 2022  - issued pink sponge for lumbrical strengthening 2022  - Positioning with pillows for  increased comfort in supine and side-lying with wear of elbow brace at night time    - recommend purchase elbow brace with adjustable flexbar and open posterior elbow for nightwear to assist with decreasing ulnar nerve symptoms   - purpose of orthosis, proper donning/doffing, wear/care schedule and precautions to monitor   - ulnar nerve glides   - use of wrapped towel around elbow at night to decrease elbow flexion and ease cubital tunnel symptoms    - HEP issued   - Progress towards goals     Written Home Exercises Provided: Patient instructed to cont prior HEP.  Exercises were reviewed and Maninder was able to demonstrate them prior to the end of the session.  Maninder demonstrated good  understanding of the HEP provided. See EMR under Patient Instructions for exercises provided during therapy sessions.       Assessment   Maninder Schneider is a 27 y.o. female referred to outpatient occupational therapy and presents with a medical diagnosis of G56.21 (ICD-10-CM) - Cubital tunnel syndrome on right.  Patient has currently 4 of 5 STGs and no LTGs.  Right SF has improved in strength and control, but patient continues to be have reports of pain with extended static gripping tasks such as writing and drawing.  Patient reports decreased elbow pain with use of kinesiotaping; she has been educated on how to instruct others to apply.  Patient will be discharge at this time as she has reached maximal benefit from occupational therapy services.          Maninder is progressing well towards her goals and there are no updates to goals at this time. Pt prognosis is Fair.     Pt will continue to benefit from skilled outpatient occupational therapy to address the deficits listed in the problem list on initial evaluation provide pt/family education and to maximize pt's level of independence in the home and community environment.     Pt's spiritual, cultural and educational needs considered and pt agreeable to plan of care and  goals.    Anticipated barriers to occupational therapy: compliance with HEP, therapy attendance       Goals:   Short Term Goals: (in 6 weeks)  1) Patient will be independent in HEP -Met 6/24/2022   2) Decrease pain in right hand to no more than 3-4/10 worst for increased functioning in ADL/IADL's -Met 8/22/2022 (reported pain level of no more than 3-4 at last 4 sessions)   3) Increase strength in SF right hand by 1/2 muscle grade for increased functioning in ADL/IADL's -Met 8/1/2022  4) Increase right hand  strength by 10% or original measures for increased functional use with ADLs/IADLs -Not met 8/22/2022  5) Patient will tolerate light resistive gripping with right hand for 1 minute with rest breaks as needed for increased functional use with ADLs/IADLs -Met 8/22/2022 (with use of blue sponge for HEP)         Long Term Goals: (in 12 weeks)  1) Decrease pain in right hand to no more than 1-2/10 worst in ADL/IADL's -Not met 8/22/2022   2) Increase strength in SF right hand by 1 muscle grade for increased functioning in ADL/IADL'sMet 8/1/2022  3) Increase right hand  strength by 15% or original measures for increased functional use with ADLs/IADLs -Not met 8/22/2022  4) Increased functioning in ADL/IADL's, as evidenced by a FOTO impairment rating of no more than 35% -Not met 8/22/2022   5) Patient will tolerate light resistive gripping with right hand for 1 minute with no rest breaks for increased functional use with ADLs/IADLs -Not met 8/22/2022        PLAN     Plan of Care Certification: 5/6/2022 to 7/29/2022, extend to 8/22/2022      Outpatient Occupational Therapy 2 times weekly for 10 weeks to include the following interventions: Paraffin, Fluidotherapy, Modalities for pain management, Therapeutic exercises/activities., Strengthening, Orthotic Fabrication/Fit/Training and Joint Protection.             Niels Glover, OT

## 2022-09-26 PROBLEM — R52 PAIN: Status: RESOLVED | Noted: 2022-05-10 | Resolved: 2022-09-26

## 2022-09-26 PROBLEM — R53.1 DECREASED STRENGTH: Status: RESOLVED | Noted: 2022-05-10 | Resolved: 2022-09-26

## 2023-05-05 LAB — TSH SERPL DL<=0.005 MIU/L-ACNC: 2.4 UIU/ML (ref 0.41–5.9)

## 2023-08-08 LAB
ABO + RH BLD: NORMAL
ANTIBODY SCREEN: NEGATIVE
CHLAMYDIA: NEGATIVE
FINAL PATHOLOGIC DIAGNOSIS: NEGATIVE
GONORRHEA: NEGATIVE
HBV SURFACE AG SERPL QL IA: NEGATIVE
HCT VFR BLD AUTO: 40.1 % (ref 36–46)
HGB BLD-MCNC: 13.3 G/DL (ref 12–16)
MCV RBC AUTO: 88.1 FL (ref 82–108)
PLATELET # BLD AUTO: 216 K/UL (ref 150–399)
RPR: NORMAL
RUBELLA IMMUNE STATUS: NORMAL

## 2023-10-17 LAB — GLUCOSE SERPL-MCNC: 77 MG/DL

## 2023-12-15 ENCOUNTER — TELEPHONE (OUTPATIENT)
Dept: OBSTETRICS AND GYNECOLOGY | Facility: CLINIC | Age: 28
End: 2023-12-15
Payer: MEDICAID

## 2023-12-15 NOTE — TELEPHONE ENCOUNTER
----- Message from Andria Seay sent at 12/15/2023 11:40 AM CST -----  .Type:  Patient Call Back    Who Called: PT       Does the patient know what this is regarding?: PT CALLED TO SEE IF THE OFFICE RECEIVED HER MEDICAL RECORDS     Would the patient rather a call back YES     Best Call Back Number: [phone]     Additional Information: Thank You

## 2023-12-27 ENCOUNTER — TELEPHONE (OUTPATIENT)
Dept: OBSTETRICS AND GYNECOLOGY | Facility: CLINIC | Age: 28
End: 2023-12-27
Payer: MEDICAID

## 2023-12-27 NOTE — TELEPHONE ENCOUNTER
Called patient and advised we have not received records.  Patient stated [facility] has told her they did fax the records (number she supplied is not one of our fax numbers).  Supplied our fax number [phone] and told her to come to her appointment even if records not received.  Patient has been trying to get her records from [facility] for a month.

## 2023-12-27 NOTE — TELEPHONE ENCOUNTER
----- Message from Catrina Mai sent at 12/27/2023  9:48 AM CST -----  Contact: Maninder Dickens is needing a call back in regards to confirming if her records was received. Please give her a call back at [phone]

## 2024-01-03 ENCOUNTER — INITIAL PRENATAL (OUTPATIENT)
Dept: OBSTETRICS AND GYNECOLOGY | Facility: CLINIC | Age: 29
End: 2024-01-03
Payer: MEDICAID

## 2024-01-03 ENCOUNTER — PATIENT MESSAGE (OUTPATIENT)
Dept: OTHER | Facility: OTHER | Age: 29
End: 2024-01-03
Payer: MEDICAID

## 2024-01-03 VITALS
SYSTOLIC BLOOD PRESSURE: 104 MMHG | DIASTOLIC BLOOD PRESSURE: 60 MMHG | WEIGHT: 228.38 LBS | BODY MASS INDEX: 42.46 KG/M2

## 2024-01-03 DIAGNOSIS — F90.0 ATTENTION DEFICIT HYPERACTIVITY DISORDER (ADHD), PREDOMINANTLY INATTENTIVE TYPE: ICD-10-CM

## 2024-01-03 DIAGNOSIS — O99.213 OBESITY AFFECTING PREGNANCY IN THIRD TRIMESTER, UNSPECIFIED OBESITY TYPE: Primary | ICD-10-CM

## 2024-01-03 DIAGNOSIS — E88.819 INSULIN RESISTANCE: ICD-10-CM

## 2024-01-03 PROBLEM — G56.21 CUBITAL TUNNEL SYNDROME ON RIGHT: Status: RESOLVED | Noted: 2022-04-11 | Resolved: 2024-01-03

## 2024-01-03 PROCEDURE — 99203 OFFICE O/P NEW LOW 30 MIN: CPT | Mod: TH,S$PBB,, | Performed by: ADVANCED PRACTICE MIDWIFE

## 2024-01-03 PROCEDURE — 99212 OFFICE O/P EST SF 10 MIN: CPT | Mod: PBBFAC,TH,PO | Performed by: ADVANCED PRACTICE MIDWIFE

## 2024-01-03 PROCEDURE — 99999 PR PBB SHADOW E&M-EST. PATIENT-LVL II: CPT | Mod: PBBFAC,,, | Performed by: ADVANCED PRACTICE MIDWIFE

## 2024-01-03 RX ORDER — GUAIFENESIN 100 MG/5ML
81 LIQUID (ML) ORAL
COMMUNITY
Start: 2023-12-19

## 2024-01-03 NOTE — PROGRESS NOTES
29 y.o. female  at 28w0d here for NOB, transfer [facility], records in [facility] reviewed  Reports + FM, denies VB, LOF or CTX  Doing well without concerns, reason for transferring was unhappy with care and wants a tub birth   /60   Wt 103.6 kg (228 lb 6.3 oz)   LMP 2023 (Approximate)   BMI 42.46 kg/m²   TW lbs   28wk labs to be done next week (A POS)  Tdap, pt is allergic to tetanus     Obesity affecting pregnancy in third trimester, unspecified obesity type  -     OB Glucose Screen; Future; Expected date: 2024  -     CBC auto differential; Future; Expected date: 2024  -     HIV 1/2 Ag/Ab (4th Gen); Future; Expected date: 2024  -     RPR; Future; Expected date: 2024    Attention deficit hyperactivity disorder (ADHD), predominantly inattentive type    Insulin resistance    Was on metformin and Vyvanse but stopped with pregnancy, early 1hr gtt 77  Reviewed warning signs, normal FKCs,  labor precautions and how/when to call.  RTC x 2 wks, call or present sooner prn.

## 2024-01-05 ENCOUNTER — PATIENT MESSAGE (OUTPATIENT)
Dept: ADMINISTRATIVE | Facility: OTHER | Age: 29
End: 2024-01-05
Payer: MEDICAID

## 2024-01-08 ENCOUNTER — LAB VISIT (OUTPATIENT)
Dept: LAB | Facility: HOSPITAL | Age: 29
End: 2024-01-08
Attending: ADVANCED PRACTICE MIDWIFE
Payer: MEDICAID

## 2024-01-08 DIAGNOSIS — O99.213 OBESITY AFFECTING PREGNANCY IN THIRD TRIMESTER, UNSPECIFIED OBESITY TYPE: ICD-10-CM

## 2024-01-08 LAB
BASOPHILS # BLD AUTO: 0.03 K/UL (ref 0–0.2)
BASOPHILS NFR BLD: 0.3 % (ref 0–1.9)
DIFFERENTIAL METHOD BLD: ABNORMAL
EOSINOPHIL # BLD AUTO: 0.2 K/UL (ref 0–0.5)
EOSINOPHIL NFR BLD: 1.8 % (ref 0–8)
ERYTHROCYTE [DISTWIDTH] IN BLOOD BY AUTOMATED COUNT: 13.4 % (ref 11.5–14.5)
GLUCOSE SERPL-MCNC: 100 MG/DL (ref 70–140)
HCT VFR BLD AUTO: 36.2 % (ref 37–48.5)
HGB BLD-MCNC: 11.7 G/DL (ref 12–16)
HIV 1+2 AB+HIV1 P24 AG SERPL QL IA: NORMAL
IMM GRANULOCYTES # BLD AUTO: 0.08 K/UL (ref 0–0.04)
IMM GRANULOCYTES NFR BLD AUTO: 0.7 % (ref 0–0.5)
LYMPHOCYTES # BLD AUTO: 1.8 K/UL (ref 1–4.8)
LYMPHOCYTES NFR BLD: 15.9 % (ref 18–48)
MCH RBC QN AUTO: 28.5 PG (ref 27–31)
MCHC RBC AUTO-ENTMCNC: 32.3 G/DL (ref 32–36)
MCV RBC AUTO: 88 FL (ref 82–98)
MONOCYTES # BLD AUTO: 0.6 K/UL (ref 0.3–1)
MONOCYTES NFR BLD: 5.4 % (ref 4–15)
NEUTROPHILS # BLD AUTO: 8.6 K/UL (ref 1.8–7.7)
NEUTROPHILS NFR BLD: 75.9 % (ref 38–73)
NRBC BLD-RTO: 0 /100 WBC
PLATELET # BLD AUTO: 223 K/UL (ref 150–450)
PMV BLD AUTO: 11.9 FL (ref 9.2–12.9)
RBC # BLD AUTO: 4.1 M/UL (ref 4–5.4)
WBC # BLD AUTO: 11.37 K/UL (ref 3.9–12.7)

## 2024-01-08 PROCEDURE — 85025 COMPLETE CBC W/AUTO DIFF WBC: CPT | Performed by: ADVANCED PRACTICE MIDWIFE

## 2024-01-08 PROCEDURE — 87389 HIV-1 AG W/HIV-1&-2 AB AG IA: CPT | Performed by: ADVANCED PRACTICE MIDWIFE

## 2024-01-08 PROCEDURE — 86592 SYPHILIS TEST NON-TREP QUAL: CPT | Performed by: ADVANCED PRACTICE MIDWIFE

## 2024-01-08 PROCEDURE — 36415 COLL VENOUS BLD VENIPUNCTURE: CPT | Mod: PO | Performed by: ADVANCED PRACTICE MIDWIFE

## 2024-01-08 PROCEDURE — 82950 GLUCOSE TEST: CPT | Performed by: ADVANCED PRACTICE MIDWIFE

## 2024-01-09 LAB — RPR SER QL: NORMAL

## 2024-01-17 ENCOUNTER — PATIENT MESSAGE (OUTPATIENT)
Dept: OTHER | Facility: OTHER | Age: 29
End: 2024-01-17
Payer: MEDICAID

## 2024-01-22 ENCOUNTER — ROUTINE PRENATAL (OUTPATIENT)
Dept: OBSTETRICS AND GYNECOLOGY | Facility: CLINIC | Age: 29
End: 2024-01-22
Payer: MEDICAID

## 2024-01-22 VITALS
SYSTOLIC BLOOD PRESSURE: 108 MMHG | BODY MASS INDEX: 44.01 KG/M2 | DIASTOLIC BLOOD PRESSURE: 72 MMHG | WEIGHT: 236.75 LBS

## 2024-01-22 DIAGNOSIS — O99.213 OBESITY AFFECTING PREGNANCY IN THIRD TRIMESTER, UNSPECIFIED OBESITY TYPE: Primary | ICD-10-CM

## 2024-01-22 PROCEDURE — 99212 OFFICE O/P EST SF 10 MIN: CPT | Mod: PBBFAC,TH | Performed by: ADVANCED PRACTICE MIDWIFE

## 2024-01-22 PROCEDURE — 99999 PR PBB SHADOW E&M-EST. PATIENT-LVL II: CPT | Mod: PBBFAC,,, | Performed by: ADVANCED PRACTICE MIDWIFE

## 2024-01-22 PROCEDURE — 99213 OFFICE O/P EST LOW 20 MIN: CPT | Mod: TH,S$PBB,, | Performed by: ADVANCED PRACTICE MIDWIFE

## 2024-01-22 NOTE — PROGRESS NOTES
29 y.o. female  at 30w5d   Reports + FM, denies VB, LOF or CTX  Doing well, common T3 discomforts, suggestions made  /72   Wt 107.4 kg (236 lb 12.4 oz)   LMP 2023 (Approximate)   BMI 44.01 kg/m²   TW lbs   28wk labs today (A POS)  Tdap, allergic, can not recieve  Will start visits every 2 weeks    Obesity affecting pregnancy in third trimester, unspecified obesity type  -     US OB/GYN Procedure ([facility])-Future; Standing    Reviewed warning signs, normal FKCs,  labor precautions and how/when to call.  RTC x 2 wks, call or present sooner prn.

## 2024-01-31 ENCOUNTER — PATIENT MESSAGE (OUTPATIENT)
Dept: OTHER | Facility: OTHER | Age: 29
End: 2024-01-31
Payer: MEDICAID

## 2024-02-07 ENCOUNTER — PROCEDURE VISIT (OUTPATIENT)
Dept: OBSTETRICS AND GYNECOLOGY | Facility: CLINIC | Age: 29
End: 2024-02-07
Payer: MEDICAID

## 2024-02-07 ENCOUNTER — ROUTINE PRENATAL (OUTPATIENT)
Dept: OBSTETRICS AND GYNECOLOGY | Facility: CLINIC | Age: 29
End: 2024-02-07
Payer: MEDICAID

## 2024-02-07 VITALS
SYSTOLIC BLOOD PRESSURE: 116 MMHG | BODY MASS INDEX: 44.51 KG/M2 | DIASTOLIC BLOOD PRESSURE: 74 MMHG | WEIGHT: 239.44 LBS

## 2024-02-07 DIAGNOSIS — O99.213 OBESITY AFFECTING PREGNANCY IN THIRD TRIMESTER, UNSPECIFIED OBESITY TYPE: Primary | ICD-10-CM

## 2024-02-07 DIAGNOSIS — O99.213 OBESITY AFFECTING PREGNANCY IN THIRD TRIMESTER, UNSPECIFIED OBESITY TYPE: ICD-10-CM

## 2024-02-07 DIAGNOSIS — G56.01 CARPAL TUNNEL SYNDROME OF RIGHT WRIST: ICD-10-CM

## 2024-02-07 PROCEDURE — 76816 OB US FOLLOW-UP PER FETUS: CPT | Mod: PBBFAC,PO | Performed by: OBSTETRICS & GYNECOLOGY

## 2024-02-07 PROCEDURE — 99999 PR PBB SHADOW E&M-EST. PATIENT-LVL II: CPT | Mod: PBBFAC,,, | Performed by: ADVANCED PRACTICE MIDWIFE

## 2024-02-07 PROCEDURE — 99212 OFFICE O/P EST SF 10 MIN: CPT | Mod: PBBFAC,TH,PO,25 | Performed by: ADVANCED PRACTICE MIDWIFE

## 2024-02-07 PROCEDURE — 99214 OFFICE O/P EST MOD 30 MIN: CPT | Mod: TH,S$PBB,, | Performed by: ADVANCED PRACTICE MIDWIFE

## 2024-02-07 PROCEDURE — 76819 FETAL BIOPHYS PROFIL W/O NST: CPT | Mod: 26,S$PBB,, | Performed by: OBSTETRICS & GYNECOLOGY

## 2024-02-07 NOTE — PROGRESS NOTES
29 y.o. female  at 33w0d   Reports + FM, denies VB, LOF or regular CTX  Doing well. Reports still having swelling to LLE, especially while working, goes away with elevation, denies leg pain though the stretching with the swelling is uncomfortable  /74   Wt 108.6 kg (239 lb 6.7 oz)   LMP 2023 (Approximate)   BMI 44.51 kg/m²   TW lbs     Obesity affecting pregnancy in third trimester, unspecified obesity type   - Weekly BPP  Carpal tunnel syndrome of right wrist   - Wearing brace at night that has provided some relief     Ultrasound today with cephalic presentation, anterior placenta, 3VC, VASILE WNL, MVP 4.4cm, EFW 20%, 4lb 8oz, BPP 8/8, AC 19%, reviewed with pt and her    Reviewed pregnancy hydration recommendations  Reviewed warning signs, normal FKCs, labor precautions and how/when to call.  RTC x 2 wks, call or present sooner prn.     DANGELO De León

## 2024-02-14 ENCOUNTER — ROUTINE PRENATAL (OUTPATIENT)
Dept: OBSTETRICS AND GYNECOLOGY | Facility: CLINIC | Age: 29
End: 2024-02-14
Payer: MEDICAID

## 2024-02-14 ENCOUNTER — PROCEDURE VISIT (OUTPATIENT)
Dept: OBSTETRICS AND GYNECOLOGY | Facility: CLINIC | Age: 29
End: 2024-02-14
Payer: MEDICAID

## 2024-02-14 VITALS
BODY MASS INDEX: 44.87 KG/M2 | WEIGHT: 241.38 LBS | DIASTOLIC BLOOD PRESSURE: 72 MMHG | SYSTOLIC BLOOD PRESSURE: 118 MMHG

## 2024-02-14 DIAGNOSIS — O99.213 OBESITY AFFECTING PREGNANCY IN THIRD TRIMESTER, UNSPECIFIED OBESITY TYPE: Primary | ICD-10-CM

## 2024-02-14 DIAGNOSIS — O99.213 OBESITY AFFECTING PREGNANCY IN THIRD TRIMESTER, UNSPECIFIED OBESITY TYPE: ICD-10-CM

## 2024-02-14 PROCEDURE — 76819 FETAL BIOPHYS PROFIL W/O NST: CPT | Mod: PBBFAC,PO | Performed by: OBSTETRICS & GYNECOLOGY

## 2024-02-14 PROCEDURE — 99214 OFFICE O/P EST MOD 30 MIN: CPT | Mod: TH,S$PBB,, | Performed by: ADVANCED PRACTICE MIDWIFE

## 2024-02-14 PROCEDURE — 99212 OFFICE O/P EST SF 10 MIN: CPT | Mod: PBBFAC,TH,PO,25 | Performed by: ADVANCED PRACTICE MIDWIFE

## 2024-02-14 PROCEDURE — 99999 PR PBB SHADOW E&M-EST. PATIENT-LVL II: CPT | Mod: PBBFAC,,, | Performed by: ADVANCED PRACTICE MIDWIFE

## 2024-02-14 NOTE — PROGRESS NOTES
29 y.o. female  at 34w0d   Reports + FM, denies VB, LOF or regular CTX  Doing well without concerns.   /72   Wt 109.5 kg (241 lb 6.5 oz)   LMP 2023 (Approximate)   BMI 44.87 kg/m²   TW lbs   GBS handout provided and reviewed  Ultrasound today with cephalic presentation, anterior placenta, MVP 3.8cm, VASILE 6.8cm, BPP 8/8    Obesity affecting pregnancy in third trimester, unspecified obesity type   - Weekly BPP     Reviewed warning signs, normal FKCs, labor precautions and how/when to call.  RTC x 2 wks, call or present sooner prn.     DANGELO De León

## 2024-02-21 ENCOUNTER — PATIENT MESSAGE (OUTPATIENT)
Dept: OTHER | Facility: OTHER | Age: 29
End: 2024-02-21
Payer: MEDICAID

## 2024-02-21 ENCOUNTER — PROCEDURE VISIT (OUTPATIENT)
Dept: OBSTETRICS AND GYNECOLOGY | Facility: CLINIC | Age: 29
End: 2024-02-21
Payer: MEDICAID

## 2024-02-21 ENCOUNTER — ROUTINE PRENATAL (OUTPATIENT)
Dept: OBSTETRICS AND GYNECOLOGY | Facility: CLINIC | Age: 29
End: 2024-02-21
Payer: MEDICAID

## 2024-02-21 VITALS
BODY MASS INDEX: 45.04 KG/M2 | WEIGHT: 242.31 LBS | DIASTOLIC BLOOD PRESSURE: 76 MMHG | SYSTOLIC BLOOD PRESSURE: 118 MMHG

## 2024-02-21 DIAGNOSIS — O99.213 OBESITY AFFECTING PREGNANCY IN THIRD TRIMESTER, UNSPECIFIED OBESITY TYPE: Primary | ICD-10-CM

## 2024-02-21 DIAGNOSIS — O99.213 OBESITY AFFECTING PREGNANCY IN THIRD TRIMESTER, UNSPECIFIED OBESITY TYPE: ICD-10-CM

## 2024-02-21 PROCEDURE — 76819 FETAL BIOPHYS PROFIL W/O NST: CPT | Mod: PBBFAC,PO | Performed by: OBSTETRICS & GYNECOLOGY

## 2024-02-21 PROCEDURE — 99212 OFFICE O/P EST SF 10 MIN: CPT | Mod: PBBFAC,TH,PO | Performed by: ADVANCED PRACTICE MIDWIFE

## 2024-02-21 PROCEDURE — 99999 PR PBB SHADOW E&M-EST. PATIENT-LVL II: CPT | Mod: PBBFAC,,, | Performed by: ADVANCED PRACTICE MIDWIFE

## 2024-02-21 PROCEDURE — 99214 OFFICE O/P EST MOD 30 MIN: CPT | Mod: TH,S$PBB,, | Performed by: ADVANCED PRACTICE MIDWIFE

## 2024-02-21 NOTE — PROGRESS NOTES
29 y.o. female  at 35w0d   Reports + FM, denies VB, LOF or regular CTX  Doing well without concerns. Has been working a lot less. Would like to get PP belly band and Breast pump from [employer]. Has started drinking 1 cup of RRLT daily mixed in pineapple juice.  /76   Wt 109.9 kg (242 lb 4.6 oz)   LMP 2023 (Approximate)   BMI 45.04 kg/m²   TW lbs   GBS handout provided and reviewed  US today: Cephalic, anterior placenta, MVP 4.1 cm, VASILE 9.1 cm, BPP 8/8    Obesity affecting pregnancy in third trimester, unspecified obesity type   - Encouraged patient to walk for 30 minutes a day    - Weekly BPP    Emphasized the importance of hydration due to VASILE being on the lower end of normal. Also recommended soaks in the tub, patient states she unable to do this d/t living in a camper.   Will send in [facility] document for patient to obtain requested items.   Reviewed warning signs, normal FKCs, labor precautions and how/when to call.  RTC x 2 wks, call or present sooner prn.     DANGELO De León

## 2024-02-26 ENCOUNTER — PROCEDURE VISIT (OUTPATIENT)
Dept: OBSTETRICS AND GYNECOLOGY | Facility: CLINIC | Age: 29
End: 2024-02-26
Payer: MEDICAID

## 2024-02-26 ENCOUNTER — ROUTINE PRENATAL (OUTPATIENT)
Dept: OBSTETRICS AND GYNECOLOGY | Facility: CLINIC | Age: 29
End: 2024-02-26
Payer: MEDICAID

## 2024-02-26 VITALS
BODY MASS INDEX: 45.69 KG/M2 | WEIGHT: 245.81 LBS | DIASTOLIC BLOOD PRESSURE: 72 MMHG | SYSTOLIC BLOOD PRESSURE: 116 MMHG

## 2024-02-26 DIAGNOSIS — O99.213 OBESITY AFFECTING PREGNANCY IN THIRD TRIMESTER, UNSPECIFIED OBESITY TYPE: Primary | ICD-10-CM

## 2024-02-26 DIAGNOSIS — O99.213 OBESITY AFFECTING PREGNANCY IN THIRD TRIMESTER, UNSPECIFIED OBESITY TYPE: ICD-10-CM

## 2024-02-26 PROCEDURE — 99999 PR PBB SHADOW E&M-EST. PATIENT-LVL II: CPT | Mod: PBBFAC,,, | Performed by: ADVANCED PRACTICE MIDWIFE

## 2024-02-26 PROCEDURE — 99214 OFFICE O/P EST MOD 30 MIN: CPT | Mod: TH,S$PBB,, | Performed by: ADVANCED PRACTICE MIDWIFE

## 2024-02-26 PROCEDURE — 76819 FETAL BIOPHYS PROFIL W/O NST: CPT | Mod: PBBFAC | Performed by: OBSTETRICS & GYNECOLOGY

## 2024-02-26 PROCEDURE — 99212 OFFICE O/P EST SF 10 MIN: CPT | Mod: PBBFAC,TH,25 | Performed by: ADVANCED PRACTICE MIDWIFE

## 2024-02-26 NOTE — PROGRESS NOTES
29 y.o. female  at 35w5d   Reports + FM, denies VB, LOF or regular CTX  Doing well, low energy today, states she had an eventful weekend with her baby shower and visiting family/friends. Concerned about swelling to LLE. States she noticed that the swelling got worse while she was sitting on the floor organizing baby shower gifts, left leg briefly went numb and resolved once she stood up.   /72   Wt 111.5 kg (245 lb 13 oz)   LMP 2023 (Approximate)   BMI 45.69 kg/m²   TW lbs , has not quite gotten around to 30 min of activity daily  GBS handout provided and reviewed, will collect nv  BPP today: cephalic, anterior placenta, MVP 3.4 cm, , results discussed with patient     Obesity affecting pregnancy in third trimester, unspecified obesity type       Reinforced the importance of walking for at least 30 minutes each day and oral hydration.   Reassured patient that the numbness and swelling was likely r/t position while sitting on the floor which could have inhibited blood flow. Encouraged elevation as need for swelling. BP WNL today and patient denies PIH symptoms.   Reviewed warning signs, normal FKCs, labor precautions and how/when to call.  RTC x 2 wks, call or present sooner prananda.     DANGELO De León

## 2024-03-06 ENCOUNTER — PROCEDURE VISIT (OUTPATIENT)
Dept: OBSTETRICS AND GYNECOLOGY | Facility: CLINIC | Age: 29
End: 2024-03-06
Payer: MEDICAID

## 2024-03-06 ENCOUNTER — ROUTINE PRENATAL (OUTPATIENT)
Dept: OBSTETRICS AND GYNECOLOGY | Facility: CLINIC | Age: 29
End: 2024-03-06
Payer: MEDICAID

## 2024-03-06 VITALS
WEIGHT: 246.06 LBS | SYSTOLIC BLOOD PRESSURE: 109 MMHG | BODY MASS INDEX: 45.73 KG/M2 | DIASTOLIC BLOOD PRESSURE: 70 MMHG

## 2024-03-06 DIAGNOSIS — O99.213 OBESITY AFFECTING PREGNANCY IN THIRD TRIMESTER, UNSPECIFIED OBESITY TYPE: ICD-10-CM

## 2024-03-06 DIAGNOSIS — Z34.03 PRIMIGRAVIDA IN THIRD TRIMESTER: ICD-10-CM

## 2024-03-06 DIAGNOSIS — E28.2 POLYCYSTIC OVARIAN DISEASE: ICD-10-CM

## 2024-03-06 DIAGNOSIS — G56.03 BILATERAL CARPAL TUNNEL SYNDROME: Primary | ICD-10-CM

## 2024-03-06 PROCEDURE — 87081 CULTURE SCREEN ONLY: CPT | Performed by: ADVANCED PRACTICE MIDWIFE

## 2024-03-06 PROCEDURE — 99213 OFFICE O/P EST LOW 20 MIN: CPT | Mod: TH,S$PBB,, | Performed by: ADVANCED PRACTICE MIDWIFE

## 2024-03-06 PROCEDURE — 76816 OB US FOLLOW-UP PER FETUS: CPT | Mod: PBBFAC | Performed by: OBSTETRICS & GYNECOLOGY

## 2024-03-06 PROCEDURE — 99212 OFFICE O/P EST SF 10 MIN: CPT | Mod: PBBFAC,TH,25 | Performed by: ADVANCED PRACTICE MIDWIFE

## 2024-03-06 PROCEDURE — 76819 FETAL BIOPHYS PROFIL W/O NST: CPT | Mod: 26,S$PBB,, | Performed by: OBSTETRICS & GYNECOLOGY

## 2024-03-06 PROCEDURE — 99999 PR PBB SHADOW E&M-EST. PATIENT-LVL II: CPT | Mod: PBBFAC,,, | Performed by: ADVANCED PRACTICE MIDWIFE

## 2024-03-06 NOTE — PROGRESS NOTES
29 y.o. female  at 37w0d  Reports + FM, denies VB, LOF or regular CTX  Doing well without concerns -1st time meeting, reviewed prenatal chart together  TW lbs   GBS collected today declines cervical check will wait to see Vasquez next visit  The skin of the suprapubic region was evaluated and appears clean and dry.  Counseled the patient to shower daily and to wash this area with an antibacterial soap such as Dial daily.  Advised her to not shave the hair from this area from now until after delivery.  I also counseled the patient to place antibacterial hand soap in all her bathrooms and kitchen at home to help facilitate proper hand hygiene practices before and after delivery.     Bilateral carpal tunnel syndrome  Has used PT previously, using wrist splints    Primigravida in third trimester  Routine prenatal care    Obesity affecting pregnancy in third trimester, unspecified obesity type  Ultrasound-vertex, MVP 2.9 cm, VASILE 8.1 cm, EFW 6 lb 8 oz at 31 percentile with AC at 19 percentile and BPP 8 8-reassuring advised to increase hydration    Polycystic ovarian disease  Past 1 hour    Reviewed warning signs, normal FKCs,  labor precautions and how/when to call. Patient states understanding  RTC x keep scheduled appointment wks, call or present sooner prn

## 2024-03-06 NOTE — PATIENT INSTRUCTIONS
Patient Education       Pregnancy - The Ninth Month   About this topic   It is important for you to learn how to take care of yourself to help you have a healthy baby and safe delivery. It is good to have health care throughout your pregnancy.  The ninth month of your pregnancy starts around week 37 and lasts through delivery. By knowing how far along you are, you can learn what is normal for this stage of your pregnancy and plan for what is next.  General   Your body   During the ninth month of your pregnancy, here are some things you can expect.  You may:  Lose your mucous plug as your cervix starts to get thinner and dilate.  Notice a small amount of streaky red or pink spotting.  Your doctor may discuss stripping your membranes to help the labor progress.  Go into labor any time. Most women deliver their baby between 38 and 42 weeks.  Notice your belly button sticks out. It should go back to normal after you give birth.  Have a bit of extra energy as you get ready for your baby  Notice your breasts are leaking more. This is normal as your body is making the first milk you can feed your baby.  Have tests to check on how your baby is doing. Your doctor will most likely not let you be pregnant for more than 42 weeks.  Not gain any weight this month. Some mothers even lose 1 to 2 pounds (.45 to .9 kg).  Your baby's growth and development:  Your baby has been busy swallowing fluid and building up waste products for their first bowel movement.  Your baby may start sucking their thumb.  They may come out with dry skin, bruises, or a misshapen head. Living in a watery fluid and going through labor is tough on your baby too. These are all normal and will change in the first weeks of life.  Your baby may have lots of hair on their head or not very much at all. Long fingernails are normal.  Your baby is about 20 inches (51 cm) long and weighs about 7 1/2 pounds (3,400 gm). Your baby is about the size of a  watermelon.  Things to Think About   Avoid alcohol, drugs, tobacco products, and second hand smoke.  Talk to your doctor if you plan to travel or get on a plane.  Have your bag packed so you are ready for delivery.  Are you planning a natural childbirth or thinking about an epidural? Know things you can do to help cope with labor pain.  If you are having a boy, decide if you want to have him circumcised.  Be sure the car seat is installed the right way so you are ready to bring your baby home.  When do I need to call the doctor?   Contractions every 5 minutes or more often that do not go away with rest, drinking water, or position changes  Headache that does not go away; blurry vision; seeing spots or halos; increase in swelling in your hands, feet, or face; and pain under your ribs on the right side  Low, dull back pain that does not go away  Pressure in your pelvis that feels like your baby is pushing down  A gush or constant trickle of watery or bloody fluid leaking from your vagina  Little to no movement felt by baby in 2 hours. Your baby should be moving at least 10 times every 2 hours.  Vaginal bleeding with or without pain  Fever of 100.4°F (38°C) or higher  After a car accident, fall, or any trauma to your belly  Having thoughts of harming yourself or others, or do not feel safe at home  Where can I learn more?   American Academy of Family Physicians  https://familydoctor.org/changes-in-your-body-during-pregnancy-third-trimester/   Kids Health  http://kidshealth.org/en/parents/pregnancy-calendar-intro.html?WT.ac=p-rl   Office on Womens Health  https://www.womenshealth.gov/pregnancy/youre-pregnant-now-what/stages-pregnancy   Last Reviewed Date   2020-05-06  Consumer Information Use and Disclaimer   This information is not specific medical advice and does not replace information you receive from your health care provider. This is only a brief summary of general information. It does NOT include all  information about conditions, illnesses, injuries, tests, procedures, treatments, therapies, discharge instructions or life-style choices that may apply to you. You must talk with your health care provider for complete information about your health and treatment options. This information should not be used to decide whether or not to accept your health care providers advice, instructions or recommendations. Only your health care provider has the knowledge and training to provide advice that is right for you.  Copyright   Copyright © 2021 [employer] and its affiliates and/or licensors. All rights reserved.  Patient Education       Fetal Movement   About this topic   Feeling your baby move for the first time is a good sign that your baby is doing well. You may begin to feel these movements between the 18th and 25th weeks of your pregnancy. If this is your first time being pregnant, it may be closer to 25 weeks. Your baby has been moving around before this, but the kicks have not been strong enough for you to feel. During the first weeks of feeling movement, you may start to see a pattern during the day when your baby is most active. You can track your baby's kicks each day at home. This is also known as kick counting. It is a good way to check on your baby's movements and well being.  Most often, fetal kick counting is used in high-risk pregnancies. It may be useful for all pregnancies. Counting and writing down your baby's kicks, jabs, twists, flutters, rolls, turns, flips, and swishes may help find a problem that needs more evaluation. The American College of Obstetricians and Gynecologists, or ACOG, suggests that you record how much time it takes you to feel 10 of these movements. Ideally, you should be able to feel 10 movements within 2 hours. Many people will track these movements in much less time.  General   How to Track Your Baby's Kick Counts   Most often your doctor will want you to wait until the 28th  "to 30th weeks of your pregnancy to start kick counting. Here are some tips to help you get started.  Find the time of day when your baby is most active. For some people, this is right after eating. Others find their baby moving a lot after they have been exercising or more active. Some babies are more active in the evenings when your blood sugar starts to lower.  Try to count kicks at about the same time each day.  Before you start counting, have something to eat or drink. Also take a short walk or do some light activity.  Choose a quiet place where you can focus on your baby's movements. Also get in a comfortable position. Try and lie on one side or the other. You may need to change positions until you find one that works best for you and your baby.  Keep a notebook to track your baby's kicks. Your doctor may give you a chart to use or you can make your own. Write down the date, time you started counting, and the time of each "kick" during a 2-hour period until you have felt 10 kicks.  Once you have recorded 10 kicks within 2 hours you can stop counting.  If you are not able to record 10 movements over 2 hours you should get up and move around or eat something and try again.  If you are not able to record 10 movements over 2 hours the second time, call your doctor. They may want you to go to the hospital to get your baby checked.  When do I need to call the doctor?   You have felt less than 10 movements over a period of 2 hours.  It takes longer each day to record 10 movements.  There is a big change in the pattern of movements you are writing down.  You feel no movement for 2 hours even after eating a snack, light activity, and position changes.  Teach Back: Helping You Understand   The Teach Back Method helps you understand the information we are giving you. After you talk with the staff, tell them in your own words what you learned. This helps to make sure the staff has described each thing clearly. It also " helps to explain things that may have been confusing. Before going home, make sure you can do these:  I can tell you about feeling my baby move.  I can tell you how I will track my babys kicks.  I can tell you what I will do if I feel less than 10 movements in 2 hours, it takes longer to feel my baby move 10 times, or there is a big change in how my baby is moving.  Last Reviewed Date   2021-10-08  Consumer Information Use and Disclaimer   This information is not specific medical advice and does not replace information you receive from your health care provider. This is only a brief summary of general information. It does NOT include all information about conditions, illnesses, injuries, tests, procedures, treatments, therapies, discharge instructions or life-style choices that may apply to you. You must talk with your health care provider for complete information about your health and treatment options. This information should not be used to decide whether or not to accept your health care providers advice, instructions or recommendations. Only your health care provider has the knowledge and training to provide advice that is right for you.  Copyright   Copyright © 2021 [employer] and its affiliates and/or licensors. All rights reserved.

## 2024-03-09 LAB — BACTERIA SPEC AEROBE CULT: NORMAL

## 2024-03-10 ENCOUNTER — HOSPITAL ENCOUNTER (INPATIENT)
Facility: HOSPITAL | Age: 29
LOS: 3 days | Discharge: HOME | End: 2024-03-13
Attending: OBSTETRICS & GYNECOLOGY | Admitting: OBSTETRICS & GYNECOLOGY
Payer: MEDICAID

## 2024-03-10 ENCOUNTER — ANESTHESIA (OUTPATIENT)
Dept: OBSTETRICS AND GYNECOLOGY | Facility: HOSPITAL | Age: 29
End: 2024-03-10
Payer: MEDICAID

## 2024-03-10 ENCOUNTER — ANESTHESIA EVENT (OUTPATIENT)
Dept: OBSTETRICS AND GYNECOLOGY | Facility: HOSPITAL | Age: 29
End: 2024-03-10
Payer: MEDICAID

## 2024-03-10 DIAGNOSIS — O42.90 AMNIOTIC FLUID LEAKING: ICD-10-CM

## 2024-03-10 DIAGNOSIS — Z98.891 S/P PRIMARY LOW TRANSVERSE C-SECTION: Primary | ICD-10-CM

## 2024-03-10 LAB
ABO + RH BLD: NORMAL
BASOPHILS # BLD AUTO: 0.02 K/UL (ref 0–0.2)
BASOPHILS NFR BLD: 0.2 % (ref 0–1.9)
BLD GP AB SCN CELLS X3 SERPL QL: NORMAL
DIFFERENTIAL METHOD BLD: ABNORMAL
EOSINOPHIL # BLD AUTO: 0.2 K/UL (ref 0–0.5)
EOSINOPHIL NFR BLD: 1.5 % (ref 0–8)
ERYTHROCYTE [DISTWIDTH] IN BLOOD BY AUTOMATED COUNT: 14.4 % (ref 11.5–14.5)
HCT VFR BLD AUTO: 39.4 % (ref 37–48.5)
HGB BLD-MCNC: 13 G/DL (ref 12–16)
IMM GRANULOCYTES # BLD AUTO: 0.04 K/UL (ref 0–0.04)
IMM GRANULOCYTES NFR BLD AUTO: 0.4 % (ref 0–0.5)
LYMPHOCYTES # BLD AUTO: 1.9 K/UL (ref 1–4.8)
LYMPHOCYTES NFR BLD: 18.3 % (ref 18–48)
MCH RBC QN AUTO: 28.1 PG (ref 27–31)
MCHC RBC AUTO-ENTMCNC: 33 G/DL (ref 32–36)
MCV RBC AUTO: 85 FL (ref 82–98)
MONOCYTES # BLD AUTO: 0.6 K/UL (ref 0.3–1)
MONOCYTES NFR BLD: 5.9 % (ref 4–15)
NEUTROPHILS # BLD AUTO: 7.6 K/UL (ref 1.8–7.7)
NEUTROPHILS NFR BLD: 73.7 % (ref 38–73)
NRBC BLD-RTO: 0 /100 WBC
PLATELET # BLD AUTO: 198 K/UL (ref 150–450)
PMV BLD AUTO: 11.4 FL (ref 9.2–12.9)
RBC # BLD AUTO: 4.63 M/UL (ref 4–5.4)
WBC # BLD AUTO: 10.28 K/UL (ref 3.9–12.7)

## 2024-03-10 PROCEDURE — 86850 RBC ANTIBODY SCREEN: CPT | Performed by: ADVANCED PRACTICE MIDWIFE

## 2024-03-10 PROCEDURE — 11000001 HC ACUTE MED/SURG PRIVATE ROOM

## 2024-03-10 PROCEDURE — 59025 FETAL NON-STRESS TEST: CPT

## 2024-03-10 PROCEDURE — 72100003 HC LABOR CARE, EA. ADDL. 8 HRS

## 2024-03-10 PROCEDURE — 72100002 HC LABOR CARE, 1ST 8 HOURS

## 2024-03-10 PROCEDURE — 99211 OFF/OP EST MAY X REQ PHY/QHP: CPT

## 2024-03-10 PROCEDURE — 63600175 PHARM REV CODE 636 W HCPCS: Performed by: MIDWIFE

## 2024-03-10 PROCEDURE — 85025 COMPLETE CBC W/AUTO DIFF WBC: CPT | Performed by: ADVANCED PRACTICE MIDWIFE

## 2024-03-10 RX ORDER — DIPHENOXYLATE HYDROCHLORIDE AND ATROPINE SULFATE 2.5; .025 MG/1; MG/1
2 TABLET ORAL EVERY 6 HOURS PRN
Status: DISCONTINUED | OUTPATIENT
Start: 2024-03-10 | End: 2024-03-11

## 2024-03-10 RX ORDER — LIDOCAINE HYDROCHLORIDE 10 MG/ML
10 INJECTION, SOLUTION EPIDURAL; INFILTRATION; INTRACAUDAL; PERINEURAL ONCE AS NEEDED
Status: DISCONTINUED | OUTPATIENT
Start: 2024-03-10 | End: 2024-03-11

## 2024-03-10 RX ORDER — SODIUM CHLORIDE, SODIUM LACTATE, POTASSIUM CHLORIDE, CALCIUM CHLORIDE 600; 310; 30; 20 MG/100ML; MG/100ML; MG/100ML; MG/100ML
INJECTION, SOLUTION INTRAVENOUS CONTINUOUS
Status: DISCONTINUED | OUTPATIENT
Start: 2024-03-10 | End: 2024-03-11

## 2024-03-10 RX ORDER — METHYLERGONOVINE MALEATE 0.2 MG/ML
200 INJECTION INTRAVENOUS ONCE AS NEEDED
Status: DISCONTINUED | OUTPATIENT
Start: 2024-03-10 | End: 2024-03-11

## 2024-03-10 RX ORDER — OXYTOCIN/RINGER'S LACTATE 30/500 ML
0-30 PLASTIC BAG, INJECTION (ML) INTRAVENOUS CONTINUOUS
Status: DISCONTINUED | OUTPATIENT
Start: 2024-03-10 | End: 2024-03-10

## 2024-03-10 RX ORDER — OXYTOCIN/RINGER'S LACTATE 30/500 ML
0-30 PLASTIC BAG, INJECTION (ML) INTRAVENOUS CONTINUOUS
Status: DISCONTINUED | OUTPATIENT
Start: 2024-03-10 | End: 2024-03-11

## 2024-03-10 RX ORDER — OXYTOCIN/RINGER'S LACTATE 30/500 ML
334 PLASTIC BAG, INJECTION (ML) INTRAVENOUS ONCE AS NEEDED
Status: DISCONTINUED | OUTPATIENT
Start: 2024-03-10 | End: 2024-03-11

## 2024-03-10 RX ORDER — OXYTOCIN/RINGER'S LACTATE 30/500 ML
334 PLASTIC BAG, INJECTION (ML) INTRAVENOUS ONCE
Status: DISCONTINUED | OUTPATIENT
Start: 2024-03-10 | End: 2024-03-11

## 2024-03-10 RX ORDER — OXYTOCIN 10 [USP'U]/ML
10 INJECTION, SOLUTION INTRAMUSCULAR; INTRAVENOUS ONCE AS NEEDED
Status: DISCONTINUED | OUTPATIENT
Start: 2024-03-10 | End: 2024-03-11

## 2024-03-10 RX ORDER — MISOPROSTOL 200 UG/1
800 TABLET ORAL ONCE AS NEEDED
Status: DISCONTINUED | OUTPATIENT
Start: 2024-03-10 | End: 2024-03-11

## 2024-03-10 RX ORDER — TRANEXAMIC ACID 10 MG/ML
1000 INJECTION, SOLUTION INTRAVENOUS EVERY 30 MIN PRN
Status: DISCONTINUED | OUTPATIENT
Start: 2024-03-10 | End: 2024-03-11

## 2024-03-10 RX ORDER — SIMETHICONE 80 MG
1 TABLET,CHEWABLE ORAL 4 TIMES DAILY PRN
Status: DISCONTINUED | OUTPATIENT
Start: 2024-03-10 | End: 2024-03-11

## 2024-03-10 RX ORDER — CALCIUM CARBONATE 200(500)MG
500 TABLET,CHEWABLE ORAL 3 TIMES DAILY PRN
Status: DISCONTINUED | OUTPATIENT
Start: 2024-03-10 | End: 2024-03-11

## 2024-03-10 RX ORDER — OXYTOCIN/RINGER'S LACTATE 30/500 ML
95 PLASTIC BAG, INJECTION (ML) INTRAVENOUS ONCE AS NEEDED
Status: DISCONTINUED | OUTPATIENT
Start: 2024-03-10 | End: 2024-03-11

## 2024-03-10 RX ORDER — CARBOPROST TROMETHAMINE 250 UG/ML
250 INJECTION, SOLUTION INTRAMUSCULAR
Status: DISCONTINUED | OUTPATIENT
Start: 2024-03-10 | End: 2024-03-11

## 2024-03-10 RX ORDER — ONDANSETRON 8 MG/1
8 TABLET, ORALLY DISINTEGRATING ORAL EVERY 8 HOURS PRN
Status: DISCONTINUED | OUTPATIENT
Start: 2024-03-10 | End: 2024-03-11

## 2024-03-10 RX ORDER — OXYTOCIN/RINGER'S LACTATE 30/500 ML
95 PLASTIC BAG, INJECTION (ML) INTRAVENOUS ONCE
Status: DISCONTINUED | OUTPATIENT
Start: 2024-03-10 | End: 2024-03-11

## 2024-03-10 RX ADMIN — SODIUM CHLORIDE, POTASSIUM CHLORIDE, SODIUM LACTATE AND CALCIUM CHLORIDE: 600; 310; 30; 20 INJECTION, SOLUTION INTRAVENOUS at 09:03

## 2024-03-10 RX ADMIN — SODIUM CHLORIDE, POTASSIUM CHLORIDE, SODIUM LACTATE AND CALCIUM CHLORIDE: 600; 310; 30; 20 INJECTION, SOLUTION INTRAVENOUS at 01:03

## 2024-03-10 RX ADMIN — Medication 2 MILLI-UNITS/MIN: at 01:03

## 2024-03-10 NOTE — HPI
30y/o  37w4d presents to LD for leaking of amniotic fluid around 0420 this am. Nitrizine positive on arrival to triage with large amount of clear fluid with vernix noted.

## 2024-03-10 NOTE — ANESTHESIA PREPROCEDURE EVALUATION
03/10/2024  Maninder Schneider is a 29 y.o., female.  30y/o  37w4d presents to LD for leaking of amniotic fluid around 0420 this am.     Pre-op Assessment    I have reviewed the Patient Summary Reports.     I have reviewed the Nursing Notes.    I have reviewed the Medications.     Review of Systems  Anesthesia Hx:  No previous Anesthesia                Social:  Former Smoker       Hematology/Oncology:  Hematology Normal   Oncology Normal                                   EENT/Dental:  EENT/Dental Normal           Cardiovascular:  Exercise tolerance: good                NYHA Classification I                           Pulmonary:  Pulmonary Normal                       Renal/:  Renal/ Normal                 Hepatic/GI:  Hepatic/GI Normal                 Musculoskeletal:  Musculoskeletal Normal                Neurological:  Neurology Normal                                      Endocrine:  Endocrine Normal          Morbid Obesity / BMI > 40  Dermatological:  Skin Normal    Psych:   anxiety                 Physical Exam  General: Well nourished and Cooperative    Airway:  Mallampati: II   Mouth Opening: Normal  Tongue: Normal  Neck ROM: Normal ROM    Dental:  Intact    Chest/Lungs:  Clear to auscultation, Normal Respiratory Rate    Heart:  Rate: Normal  Rhythm: Regular Rhythm        Anesthesia Plan  Type of Anesthesia, risks & benefits discussed:    Anesthesia Type: Epidural, CSE  Intra-op Monitoring Plan: Standard ASA Monitors  Post Op Pain Control Plan: epidural analgesia and multimodal analgesia  Airway Plan: , Post-Induction  Informed Consent: Informed consent signed with the Patient and all parties understand the risks and agree with anesthesia plan.  All questions answered. Patient consented to blood products? Yes  ASA Score: 2  Day of Surgery Review of History & Physical: H&P Update referred to the  surgeon/provider.    Ready For Surgery From Anesthesia Perspective.     .

## 2024-03-10 NOTE — H&P
[facility] - Labor & Delivery  Obstetrics  History & Physical    Patient Name: Maninder Schneider  MRN: [id number]  Admission Date: 3/10/2024  Primary Care Provider: Rico Little MD    Subjective:     Principal Problem:Amniotic fluid leaking    History of Present Illness:  30y/o  37w4d presents to LD for leaking of amniotic fluid around 0420 this am. Nitrizine positive on arrival to triage with large amount of clear fluid with vernix noted.     Obstetric HPI:  Patient reports mild/moderate contractions since SROM at 0420 this am, active fetal movement, No vaginal bleeding , Yes loss of fluid     This pregnancy has been complicated by Obesity    OB History    Para Term  AB Living   1 0 0 0 0 0   SAB IAB Ectopic Multiple Live Births   0 0 0 0 0      # Outcome Date GA Lbr Parish/2nd Weight Sex Delivery Anes PTL Lv   1 Current              Past Medical History:   Diagnosis Date    Anxiety disorder, unspecified      History reviewed. No pertinent surgical history.    PTA Medications   Medication Sig    aspirin 81 MG Chew Take 81 mg by mouth.       Review of patient's allergies indicates:   Allergen Reactions    Tetanus vaccines and toxoid Rash and Swelling        Family History       Problem Relation (Age of Onset)    No Known Problems Mother, Father          Tobacco Use    Smoking status: Former    Smokeless tobacco: Former    Tobacco comments:     Vape   Substance and Sexual Activity    Alcohol use: Never    Drug use: Never    Sexual activity: Yes     Partners: Male     Review of Systems   Gastrointestinal:  Positive for abdominal pain.   Genitourinary:  Positive for vaginal discharge.   All other systems reviewed and are negative.     Objective:     Vital Signs (Most Recent):  Pulse: 77 (03/10/24 0612)  BP: 130/70 (03/10/24 0612) Vital Signs (24h Range):  Pulse:  [77] 77  BP: (130)/(70) 130/70        Body mass index is 46.49 kg/m².    FHT: 140 Cat 1 (reassuring)  TOCO:  Q 3-4 minutes     Physical Exam:    Constitutional: She is oriented to person, place, and time. She appears well-developed and well-nourished.    HENT:   Head: Normocephalic.      Cardiovascular:  Normal rate.             Pulmonary/Chest: Effort normal.        Abdominal: Soft.     Genitourinary: There is vaginal discharge in the vagina.           Musculoskeletal: Normal range of motion.       Neurological: She is alert and oriented to person, place, and time.    Skin: Skin is warm and dry. Capillary refill takes less than 2 seconds.    Psychiatric: She has a normal mood and affect. Her behavior is normal. Judgment and thought content normal.        Cervix: per RN  Dilation:  3  Effacement:  80  Station: -2  Presentation: Vertex     Significant Labs:  Lab Results   Component Value Date    GROUPTRH A POS 2023    HEPBSAG Negative 2023    STREPBCULT No Group B Streptococcus isolated 2024       I have personallly reviewed all pertinent lab results from the last 24 hours.  Recent Lab Results         03/10/24  0624        Baso # 0.02       Basophil % 0.2       Differential Method Automated       Eos # 0.2       Eos % 1.5       Gran # (ANC) 7.6       Gran % 73.7       Hematocrit 39.4       Hemoglobin 13.0       Immature Grans (Abs) 0.04  Comment: Mild elevation in immature granulocytes is non specific and   can be seen in a variety of conditions including stress response,   acute inflammation, trauma and pregnancy. Correlation with other   laboratory and clinical findings is essential.         Immature Granulocytes 0.4       Lymph # 1.9       Lymph % 18.3       MCH 28.1       MCHC 33.0       MCV 85       Mono # 0.6       Mono % 5.9       MPV 11.4       nRBC 0       Platelet Count 198       RBC 4.63       RDW 14.4       WBC 10.28             Assessment/Plan:     29 y.o. female  at 37w4d for:    * Amniotic fluid leaking  Closely monitor maternal/fetal status  Anticipate progress and     Primigravida in third trimester  Admit to  LD.  Pt plans NCB, support as needed  Anticipate progress and     Obesity affecting pregnancy in third trimester  Lovenox PP      DANGELO Velásquez, CALEB  Obstetrics  [facility] - Labor & Delivery

## 2024-03-10 NOTE — PROGRESS NOTES
"LABOR NOTE    S:  Pt resting in bed no complaints.  Family at bedside and supportive.     O: BP (!) 137/54   Pulse 77   Temp 97.6 °F (36.4 °C) (Oral)   Resp 18   Ht 5' 1" (1.549 m)   LMP 2023 (Approximate)   Breastfeeding No   BMI 46.49 kg/m²     GENERAL: Calm and appropriate affect  NEURO: Alert, oriented, normal speech  ABDOMEN: Nontender, Fundus palpates soft between UC's.  FHT: Baseline 140, moderate BTBV, positive accels, no decels. Cat 1, reassuring.  CTX: irregular    ASSESSMENT:   29 y.o.  IUP at 37w4d, FHT reassuring/ Cat 1    Patient Active Problem List   Diagnosis    Polycystic ovarian disease    Anxiety    Attention deficit hyperactivity disorder (ADHD), predominantly inattentive type    Dyspareunia    Insulin resistance    Personality disorder    Bilateral carpal tunnel syndrome    Obesity affecting pregnancy in third trimester    Primigravida in third trimester    Amniotic fluid leaking         PLAN:  Continue close Maternal/Fetal monitoring  Pt desires to attempt walking and pumping to stimulate contractions then recheck cervix after  If no change pt counseled on pitocin augmentation if necessary. Pt and  agree with plan.  Recheck 2 hours or PRN  Epidural per anesthesia at pt's request  Anticipate progress and     DANGELO Velásquez  "

## 2024-03-10 NOTE — HOSPITAL COURSE
Admit to LD  SROM 0420 3/10  Pt plans NCB, will recheck cervix for change in 2 hours  Augmentation as needed.     3/11/24 @ 0845:  Epidural in place  VE /3  Discussed prolong  ruptured, inadequate contractions, and FTP.  Patient desires to  continue attempts for vaginal delivery.   Afebrile   Continue  pitocin  Anticipate    2024--primary c/section for failure to progress, mother and infant transferred to [facility] for routine postpartum care  Post-operative course was unremarkable and pt recovered well.  Pt was discharged on POD#2 upon meeting all discharge criteria.  Pt was counseled on post-operative care and warning sign instructions prior to her discharge.

## 2024-03-10 NOTE — PROGRESS NOTES
"LABOR NOTE    S:  Pt walking and pumping, no complaints.  Family at bedside and supportive.     O: /67   Pulse 77   Temp 98.1 °F (36.7 °C) (Oral)   Resp 18   Ht 5' 1" (1.549 m)   LMP 2023 (Approximate)   Breastfeeding No   BMI 46.49 kg/m²     GENERAL: Calm and appropriate affect  NEURO: Alert, oriented, normal speech  ABDOMEN: Nontender, Fundus palpates soft between UC's.  FHT: Baseline 130, intermittent EFM  CTX: pt reports none  SVE: declined      ASSESSMENT:   29 y.o.  IUP at 37w4d, FHT reassuring/ Cat 1    Patient Active Problem List   Diagnosis    Polycystic ovarian disease    Anxiety    Attention deficit hyperactivity disorder (ADHD), predominantly inattentive type    Dyspareunia    Insulin resistance    Personality disorder    Bilateral carpal tunnel syndrome    Obesity affecting pregnancy in third trimester    Primigravida in third trimester    Amniotic fluid leaking         PLAN:  Continue close Maternal/Fetal monitoring  Pitocin Augmentation discussed in length with pt and . Pt declines at this time.  Risk of prolonged ROM discussed with pt and . Pt and  verbalized understanding  Recheck 2 hours or PRN  Epidural per anesthesia at pt's request  Anticipate progress and     DANGELO Velásquez        "

## 2024-03-10 NOTE — SUBJECTIVE & OBJECTIVE
Obstetric HPI:  Patient reports mild/moderate contractions since SROM at 0420 this am, active fetal movement, No vaginal bleeding , Yes loss of fluid     This pregnancy has been complicated by Obesity    OB History    Para Term  AB Living   1 0 0 0 0 0   SAB IAB Ectopic Multiple Live Births   0 0 0 0 0      # Outcome Date GA Lbr Parish/2nd Weight Sex Delivery Anes PTL Lv   1 Current              Past Medical History:   Diagnosis Date    Anxiety disorder, unspecified      History reviewed. No pertinent surgical history.    PTA Medications   Medication Sig    aspirin 81 MG Chew Take 81 mg by mouth.       Review of patient's allergies indicates:   Allergen Reactions    Tetanus vaccines and toxoid Rash and Swelling        Family History       Problem Relation (Age of Onset)    No Known Problems Mother, Father          Tobacco Use    Smoking status: Former    Smokeless tobacco: Former    Tobacco comments:     Vape   Substance and Sexual Activity    Alcohol use: Never    Drug use: Never    Sexual activity: Yes     Partners: Male     Review of Systems   Gastrointestinal:  Positive for abdominal pain.   Genitourinary:  Positive for vaginal discharge.   All other systems reviewed and are negative.     Objective:     Vital Signs (Most Recent):  Pulse: 77 (03/10/24 06)  BP: 130/70 (03/10/24 0612) Vital Signs (24h Range):  Pulse:  [77] 77  BP: (130)/(70) 130/70        Body mass index is 46.49 kg/m².    FHT: 140 Cat 1 (reassuring)  TOCO:  Q 3-4 minutes     Physical Exam:   Constitutional: She is oriented to person, place, and time. She appears well-developed and well-nourished.    HENT:   Head: Normocephalic.      Cardiovascular:  Normal rate.             Pulmonary/Chest: Effort normal.        Abdominal: Soft.     Genitourinary: There is vaginal discharge in the vagina.           Musculoskeletal: Normal range of motion.       Neurological: She is alert and oriented to person, place, and time.    Skin: Skin is warm  and dry. Capillary refill takes less than 2 seconds.    Psychiatric: She has a normal mood and affect. Her behavior is normal. Judgment and thought content normal.        Cervix: per RN  Dilation:  3  Effacement:  80  Station: -2  Presentation: Vertex     Significant Labs:  Lab Results   Component Value Date    GROUPTRH A POS 08/08/2023    HEPBSAG Negative 08/08/2023    STREPBCULT No Group B Streptococcus isolated 03/06/2024       I have personallly reviewed all pertinent lab results from the last 24 hours.  Recent Lab Results         03/10/24  0624        Baso # 0.02       Basophil % 0.2       Differential Method Automated       Eos # 0.2       Eos % 1.5       Gran # (ANC) 7.6       Gran % 73.7       Hematocrit 39.4       Hemoglobin 13.0       Immature Grans (Abs) 0.04  Comment: Mild elevation in immature granulocytes is non specific and   can be seen in a variety of conditions including stress response,   acute inflammation, trauma and pregnancy. Correlation with other   laboratory and clinical findings is essential.         Immature Granulocytes 0.4       Lymph # 1.9       Lymph % 18.3       MCH 28.1       MCHC 33.0       MCV 85       Mono # 0.6       Mono % 5.9       MPV 11.4       nRBC 0       Platelet Count 198       RBC 4.63       RDW 14.4       WBC 10.28

## 2024-03-11 PROBLEM — Z98.891 S/P PRIMARY LOW TRANSVERSE C-SECTION: Status: ACTIVE | Noted: 2024-03-11

## 2024-03-11 PROCEDURE — 71000033 HC RECOVERY, INTIAL HOUR: Performed by: OBSTETRICS & GYNECOLOGY

## 2024-03-11 PROCEDURE — 25000003 PHARM REV CODE 250: Performed by: NURSE ANESTHETIST, CERTIFIED REGISTERED

## 2024-03-11 PROCEDURE — 72100003 HC LABOR CARE, EA. ADDL. 8 HRS

## 2024-03-11 PROCEDURE — 37000009 HC ANESTHESIA EA ADD 15 MINS: Performed by: OBSTETRICS & GYNECOLOGY

## 2024-03-11 PROCEDURE — 71000039 HC RECOVERY, EACH ADD'L HOUR: Performed by: OBSTETRICS & GYNECOLOGY

## 2024-03-11 PROCEDURE — 63600175 PHARM REV CODE 636 W HCPCS: Performed by: MIDWIFE

## 2024-03-11 PROCEDURE — 63600175 PHARM REV CODE 636 W HCPCS: Performed by: OBSTETRICS & GYNECOLOGY

## 2024-03-11 PROCEDURE — 37000008 HC ANESTHESIA 1ST 15 MINUTES: Performed by: OBSTETRICS & GYNECOLOGY

## 2024-03-11 PROCEDURE — 11000001 HC ACUTE MED/SURG PRIVATE ROOM

## 2024-03-11 PROCEDURE — 36004725 HC OB OR TIME LEV III - EA ADD 15 MIN: Performed by: OBSTETRICS & GYNECOLOGY

## 2024-03-11 PROCEDURE — 62326 NJX INTERLAMINAR LMBR/SAC: CPT | Performed by: NURSE ANESTHETIST, CERTIFIED REGISTERED

## 2024-03-11 PROCEDURE — 63600175 PHARM REV CODE 636 W HCPCS: Performed by: NURSE ANESTHETIST, CERTIFIED REGISTERED

## 2024-03-11 PROCEDURE — 59514 CESAREAN DELIVERY ONLY: CPT | Mod: AT,,, | Performed by: OBSTETRICS & GYNECOLOGY

## 2024-03-11 PROCEDURE — 36004724 HC OB OR TIME LEV III - 1ST 15 MIN: Performed by: OBSTETRICS & GYNECOLOGY

## 2024-03-11 PROCEDURE — 63600175 PHARM REV CODE 636 W HCPCS

## 2024-03-11 PROCEDURE — 25000003 PHARM REV CODE 250: Performed by: OBSTETRICS & GYNECOLOGY

## 2024-03-11 PROCEDURE — 27800516 HC TRAY, EPIDURAL COMBO: Performed by: ANESTHESIOLOGY

## 2024-03-11 PROCEDURE — 51702 INSERT TEMP BLADDER CATH: CPT

## 2024-03-11 PROCEDURE — 27200710 HC EPIDURAL INFUSION PUMP SET: Performed by: ANESTHESIOLOGY

## 2024-03-11 PROCEDURE — 27000181 HC CABLE, IUPC

## 2024-03-11 RX ORDER — ACETAMINOPHEN 325 MG/1
650 TABLET ORAL EVERY 6 HOURS PRN
Status: DISCONTINUED | OUTPATIENT
Start: 2024-03-11 | End: 2024-03-13 | Stop reason: HOSPADM

## 2024-03-11 RX ORDER — DIPHENOXYLATE HYDROCHLORIDE AND ATROPINE SULFATE 2.5; .025 MG/1; MG/1
2 TABLET ORAL EVERY 6 HOURS PRN
Status: DISCONTINUED | OUTPATIENT
Start: 2024-03-11 | End: 2024-03-13 | Stop reason: HOSPADM

## 2024-03-11 RX ORDER — IBUPROFEN 800 MG/1
800 TABLET ORAL EVERY 8 HOURS
Status: DISCONTINUED | OUTPATIENT
Start: 2024-03-12 | End: 2024-03-11

## 2024-03-11 RX ORDER — OXYTOCIN/RINGER'S LACTATE 30/500 ML
334 PLASTIC BAG, INJECTION (ML) INTRAVENOUS ONCE
Status: COMPLETED | OUTPATIENT
Start: 2024-03-11 | End: 2024-03-11

## 2024-03-11 RX ORDER — LIDOCAINE HCL/EPINEPHRINE/PF 2%-1:200K
VIAL (ML) INJECTION
Status: DISCONTINUED | OUTPATIENT
Start: 2024-03-11 | End: 2024-03-11

## 2024-03-11 RX ORDER — BISACODYL 10 MG/1
10 SUPPOSITORY RECTAL ONCE AS NEEDED
Status: DISCONTINUED | OUTPATIENT
Start: 2024-03-11 | End: 2024-03-13 | Stop reason: HOSPADM

## 2024-03-11 RX ORDER — ONDANSETRON 8 MG/1
8 TABLET, ORALLY DISINTEGRATING ORAL EVERY 8 HOURS PRN
Status: DISCONTINUED | OUTPATIENT
Start: 2024-03-11 | End: 2024-03-13 | Stop reason: HOSPADM

## 2024-03-11 RX ORDER — ROPIVACAINE HYDROCHLORIDE 2 MG/ML
INJECTION, SOLUTION EPIDURAL; INFILTRATION
Status: DISCONTINUED | OUTPATIENT
Start: 2024-03-11 | End: 2024-03-11

## 2024-03-11 RX ORDER — OXYCODONE AND ACETAMINOPHEN 5; 325 MG/1; MG/1
1 TABLET ORAL EVERY 4 HOURS PRN
Status: DISCONTINUED | OUTPATIENT
Start: 2024-03-11 | End: 2024-03-13 | Stop reason: HOSPADM

## 2024-03-11 RX ORDER — CEFAZOLIN SODIUM 2 G/50ML
2 SOLUTION INTRAVENOUS ONCE AS NEEDED
Status: DISCONTINUED | OUTPATIENT
Start: 2024-03-11 | End: 2024-03-11

## 2024-03-11 RX ORDER — ONDANSETRON HYDROCHLORIDE 2 MG/ML
INJECTION, SOLUTION INTRAVENOUS
Status: DISCONTINUED | OUTPATIENT
Start: 2024-03-11 | End: 2024-03-11

## 2024-03-11 RX ORDER — LIDOCAINE HYDROCHLORIDE AND EPINEPHRINE 15; 5 MG/ML; UG/ML
INJECTION, SOLUTION EPIDURAL
Status: DISCONTINUED | OUTPATIENT
Start: 2024-03-11 | End: 2024-03-11

## 2024-03-11 RX ORDER — PHENYLEPHRINE HYDROCHLORIDE 10 MG/ML
INJECTION INTRAVENOUS
Status: DISCONTINUED | OUTPATIENT
Start: 2024-03-11 | End: 2024-03-11

## 2024-03-11 RX ORDER — MISOPROSTOL 200 UG/1
800 TABLET ORAL
Status: DISCONTINUED | OUTPATIENT
Start: 2024-03-11 | End: 2024-03-11

## 2024-03-11 RX ORDER — KETOROLAC TROMETHAMINE 30 MG/ML
30 INJECTION, SOLUTION INTRAMUSCULAR; INTRAVENOUS
Status: DISCONTINUED | OUTPATIENT
Start: 2024-03-11 | End: 2024-03-11

## 2024-03-11 RX ORDER — METHYLERGONOVINE MALEATE 0.2 MG/ML
200 INJECTION INTRAVENOUS
Status: DISCONTINUED | OUTPATIENT
Start: 2024-03-11 | End: 2024-03-11

## 2024-03-11 RX ORDER — OXYTOCIN/RINGER'S LACTATE 30/500 ML
95 PLASTIC BAG, INJECTION (ML) INTRAVENOUS ONCE
Status: DISCONTINUED | OUTPATIENT
Start: 2024-03-11 | End: 2024-03-13 | Stop reason: HOSPADM

## 2024-03-11 RX ORDER — EPHEDRINE SULFATE 50 MG/ML
50 INJECTION, SOLUTION INTRAVENOUS ONCE
Status: COMPLETED | OUTPATIENT
Start: 2024-03-11 | End: 2024-03-11

## 2024-03-11 RX ORDER — MORPHINE SULFATE 1 MG/ML
INJECTION, SOLUTION EPIDURAL; INTRATHECAL; INTRAVENOUS
Status: DISCONTINUED | OUTPATIENT
Start: 2024-03-11 | End: 2024-03-11

## 2024-03-11 RX ORDER — KETOROLAC TROMETHAMINE 30 MG/ML
INJECTION, SOLUTION INTRAMUSCULAR; INTRAVENOUS
Status: DISCONTINUED | OUTPATIENT
Start: 2024-03-11 | End: 2024-03-11

## 2024-03-11 RX ORDER — DEXMEDETOMIDINE HYDROCHLORIDE 100 UG/ML
INJECTION, SOLUTION INTRAVENOUS
Status: DISCONTINUED | OUTPATIENT
Start: 2024-03-11 | End: 2024-03-11

## 2024-03-11 RX ORDER — SODIUM CITRATE AND CITRIC ACID MONOHYDRATE 334; 500 MG/5ML; MG/5ML
30 SOLUTION ORAL
Status: DISCONTINUED | OUTPATIENT
Start: 2024-03-11 | End: 2024-03-11

## 2024-03-11 RX ORDER — SODIUM CHLORIDE, SODIUM LACTATE, POTASSIUM CHLORIDE, CALCIUM CHLORIDE 600; 310; 30; 20 MG/100ML; MG/100ML; MG/100ML; MG/100ML
INJECTION, SOLUTION INTRAVENOUS CONTINUOUS
Status: DISCONTINUED | OUTPATIENT
Start: 2024-03-11 | End: 2024-03-11

## 2024-03-11 RX ORDER — TRANEXAMIC ACID 10 MG/ML
1000 INJECTION, SOLUTION INTRAVENOUS EVERY 30 MIN PRN
Status: DISCONTINUED | OUTPATIENT
Start: 2024-03-11 | End: 2024-03-13 | Stop reason: HOSPADM

## 2024-03-11 RX ORDER — CEFAZOLIN SODIUM 1 G/3ML
INJECTION, POWDER, FOR SOLUTION INTRAMUSCULAR; INTRAVENOUS
Status: DISCONTINUED | OUTPATIENT
Start: 2024-03-11 | End: 2024-03-11

## 2024-03-11 RX ORDER — MISOPROSTOL 200 UG/1
800 TABLET ORAL ONCE AS NEEDED
Status: DISCONTINUED | OUTPATIENT
Start: 2024-03-11 | End: 2024-03-13 | Stop reason: HOSPADM

## 2024-03-11 RX ORDER — HYDROCORTISONE 25 MG/G
CREAM TOPICAL 3 TIMES DAILY PRN
Status: DISCONTINUED | OUTPATIENT
Start: 2024-03-11 | End: 2024-03-13 | Stop reason: HOSPADM

## 2024-03-11 RX ORDER — CARBOPROST TROMETHAMINE 250 UG/ML
250 INJECTION, SOLUTION INTRAMUSCULAR
Status: DISCONTINUED | OUTPATIENT
Start: 2024-03-11 | End: 2024-03-11

## 2024-03-11 RX ORDER — METHYLERGONOVINE MALEATE 0.2 MG/ML
200 INJECTION INTRAVENOUS ONCE AS NEEDED
Status: DISCONTINUED | OUTPATIENT
Start: 2024-03-11 | End: 2024-03-13 | Stop reason: HOSPADM

## 2024-03-11 RX ORDER — CARBOPROST TROMETHAMINE 250 UG/ML
250 INJECTION, SOLUTION INTRAMUSCULAR
Status: DISCONTINUED | OUTPATIENT
Start: 2024-03-11 | End: 2024-03-13 | Stop reason: HOSPADM

## 2024-03-11 RX ORDER — SIMETHICONE 80 MG
1 TABLET,CHEWABLE ORAL EVERY 6 HOURS PRN
Status: DISCONTINUED | OUTPATIENT
Start: 2024-03-11 | End: 2024-03-13 | Stop reason: HOSPADM

## 2024-03-11 RX ORDER — OXYCODONE AND ACETAMINOPHEN 10; 325 MG/1; MG/1
1 TABLET ORAL EVERY 4 HOURS PRN
Status: DISCONTINUED | OUTPATIENT
Start: 2024-03-11 | End: 2024-03-13 | Stop reason: HOSPADM

## 2024-03-11 RX ORDER — DIPHENHYDRAMINE HCL 25 MG
25 CAPSULE ORAL EVERY 4 HOURS PRN
Status: DISCONTINUED | OUTPATIENT
Start: 2024-03-11 | End: 2024-03-13 | Stop reason: HOSPADM

## 2024-03-11 RX ORDER — EPHEDRINE SULFATE 50 MG/ML
INJECTION, SOLUTION INTRAVENOUS
Status: DISCONTINUED | OUTPATIENT
Start: 2024-03-11 | End: 2024-03-11

## 2024-03-11 RX ORDER — OXYTOCIN/RINGER'S LACTATE 30/500 ML
95 PLASTIC BAG, INJECTION (ML) INTRAVENOUS ONCE AS NEEDED
Status: DISCONTINUED | OUTPATIENT
Start: 2024-03-11 | End: 2024-03-13 | Stop reason: HOSPADM

## 2024-03-11 RX ORDER — OXYTOCIN/RINGER'S LACTATE 30/500 ML
95 PLASTIC BAG, INJECTION (ML) INTRAVENOUS ONCE
Status: DISCONTINUED | OUTPATIENT
Start: 2024-03-11 | End: 2024-03-11

## 2024-03-11 RX ORDER — ADHESIVE BANDAGE
30 BANDAGE TOPICAL DAILY PRN
Status: DISCONTINUED | OUTPATIENT
Start: 2024-03-11 | End: 2024-03-13 | Stop reason: HOSPADM

## 2024-03-11 RX ORDER — DOCUSATE SODIUM 100 MG/1
100 CAPSULE, LIQUID FILLED ORAL DAILY
Status: DISCONTINUED | OUTPATIENT
Start: 2024-03-12 | End: 2024-03-13 | Stop reason: HOSPADM

## 2024-03-11 RX ORDER — DIPHENHYDRAMINE HYDROCHLORIDE 50 MG/ML
25 INJECTION INTRAMUSCULAR; INTRAVENOUS EVERY 4 HOURS PRN
Status: DISCONTINUED | OUTPATIENT
Start: 2024-03-11 | End: 2024-03-13 | Stop reason: HOSPADM

## 2024-03-11 RX ORDER — OXYTOCIN/RINGER'S LACTATE 30/500 ML
334 PLASTIC BAG, INJECTION (ML) INTRAVENOUS ONCE AS NEEDED
Status: DISCONTINUED | OUTPATIENT
Start: 2024-03-11 | End: 2024-03-13 | Stop reason: HOSPADM

## 2024-03-11 RX ORDER — OXYTOCIN 10 [USP'U]/ML
10 INJECTION, SOLUTION INTRAMUSCULAR; INTRAVENOUS ONCE AS NEEDED
Status: DISCONTINUED | OUTPATIENT
Start: 2024-03-11 | End: 2024-03-13 | Stop reason: HOSPADM

## 2024-03-11 RX ORDER — FAMOTIDINE 10 MG/ML
20 INJECTION INTRAVENOUS
Status: DISCONTINUED | OUTPATIENT
Start: 2024-03-11 | End: 2024-03-11

## 2024-03-11 RX ORDER — HYDROMORPHONE HYDROCHLORIDE 2 MG/ML
1 INJECTION, SOLUTION INTRAMUSCULAR; INTRAVENOUS; SUBCUTANEOUS EVERY 4 HOURS PRN
Status: DISCONTINUED | OUTPATIENT
Start: 2024-03-11 | End: 2024-03-13 | Stop reason: HOSPADM

## 2024-03-11 RX ORDER — MIDAZOLAM HYDROCHLORIDE 1 MG/ML
INJECTION, SOLUTION INTRAMUSCULAR; INTRAVENOUS
Status: DISCONTINUED | OUTPATIENT
Start: 2024-03-11 | End: 2024-03-11

## 2024-03-11 RX ORDER — IBUPROFEN 800 MG/1
800 TABLET ORAL EVERY 8 HOURS
Status: DISCONTINUED | OUTPATIENT
Start: 2024-03-11 | End: 2024-03-13 | Stop reason: HOSPADM

## 2024-03-11 RX ADMIN — KETOROLAC TROMETHAMINE 30 MG: 30 INJECTION, SOLUTION INTRAMUSCULAR; INTRAVENOUS at 02:03

## 2024-03-11 RX ADMIN — MORPHINE SULFATE 4 MG: 1 INJECTION, SOLUTION EPIDURAL; INTRATHECAL; INTRAVENOUS at 02:03

## 2024-03-11 RX ADMIN — KETOROLAC TROMETHAMINE 30 MG: 30 INJECTION, SOLUTION INTRAMUSCULAR; INTRAVENOUS at 07:03

## 2024-03-11 RX ADMIN — EPHEDRINE SULFATE 10 MG: 50 INJECTION INTRAVENOUS at 02:03

## 2024-03-11 RX ADMIN — ROPIVACAINE HYDROCHLORIDE 6 ML: 2 INJECTION, SOLUTION EPIDURAL; INFILTRATION at 01:03

## 2024-03-11 RX ADMIN — ROPIVACAINE HYDROCHLORIDE 12 ML/HR: 2 INJECTION, SOLUTION EPIDURAL; INFILTRATION at 01:03

## 2024-03-11 RX ADMIN — SODIUM CHLORIDE, POTASSIUM CHLORIDE, SODIUM LACTATE AND CALCIUM CHLORIDE 1000 ML: 600; 310; 30; 20 INJECTION, SOLUTION INTRAVENOUS at 01:03

## 2024-03-11 RX ADMIN — DEXMEDETOMIDINE 25 MCG: 200 INJECTION, SOLUTION INTRAVENOUS at 01:03

## 2024-03-11 RX ADMIN — PHENYLEPHRINE HYDROCHLORIDE 200 MCG: 10 INJECTION INTRAVENOUS at 02:03

## 2024-03-11 RX ADMIN — PHENYLEPHRINE HYDROCHLORIDE 100 MCG: 10 INJECTION INTRAVENOUS at 02:03

## 2024-03-11 RX ADMIN — CEFAZOLIN 2 G: 330 INJECTION, POWDER, FOR SOLUTION INTRAMUSCULAR; INTRAVENOUS at 02:03

## 2024-03-11 RX ADMIN — EPHEDRINE SULFATE 50 MG: 50 INJECTION INTRAVENOUS at 02:03

## 2024-03-11 RX ADMIN — ACETAMINOPHEN 650 MG: 325 TABLET ORAL at 07:03

## 2024-03-11 RX ADMIN — SODIUM CHLORIDE, SODIUM LACTATE, POTASSIUM CHLORIDE, AND CALCIUM CHLORIDE: .6; .31; .03; .02 INJECTION, SOLUTION INTRAVENOUS at 01:03

## 2024-03-11 RX ADMIN — ONDANSETRON 8 MG: 2 INJECTION INTRAMUSCULAR; INTRAVENOUS at 02:03

## 2024-03-11 RX ADMIN — LIDOCAINE HYDROCHLORIDE,EPINEPHRINE BITARTRATE 3 ML: 15; .005 INJECTION, SOLUTION EPIDURAL; INFILTRATION; INTRACAUDAL; PERINEURAL at 01:03

## 2024-03-11 RX ADMIN — EPHEDRINE SULFATE 30 MG: 50 INJECTION, SOLUTION INTRAVENOUS at 02:03

## 2024-03-11 RX ADMIN — Medication 334 MILLI-UNITS/MIN: at 02:03

## 2024-03-11 RX ADMIN — MORPHINE SULFATE 2 MG: 1 INJECTION, SOLUTION EPIDURAL; INTRATHECAL; INTRAVENOUS at 02:03

## 2024-03-11 RX ADMIN — SODIUM CHLORIDE, POTASSIUM CHLORIDE, SODIUM LACTATE AND CALCIUM CHLORIDE: 600; 310; 30; 20 INJECTION, SOLUTION INTRAVENOUS at 01:03

## 2024-03-11 RX ADMIN — LIDOCAINE HYDROCHLORIDE AND EPINEPHRINE 5 ML: 20; 5 INJECTION, SOLUTION EPIDURAL; INFILTRATION; INTRACAUDAL; PERINEURAL at 01:03

## 2024-03-11 RX ADMIN — MIDAZOLAM 2 MG: 1 INJECTION INTRAMUSCULAR; INTRAVENOUS at 02:03

## 2024-03-11 NOTE — PROGRESS NOTES
S: comfortable with epidural  O:  VS reviewed, afebrile   Vitals:    03/11/24 0930 03/11/24 1000 03/11/24 1100 03/11/24 1123   BP: (!) 113/51 (!) 108/57 (!) 120/58 122/62   Pulse: 84 84 81 81   Resp:       Temp:       TempSrc:       SpO2: 100% 100% 100% 100%   Height:           FHTs 140 moderate variability, category 1  UC  2-3 min,   SVE no change    A: IUP @ 37w5d ;     Patient Active Problem List   Diagnosis    Polycystic ovarian disease    Anxiety    Attention deficit hyperactivity disorder (ADHD), predominantly inattentive type    Dyspareunia    Insulin resistance    Personality disorder    Bilateral carpal tunnel syndrome    Obesity affecting pregnancy in third trimester    Primigravida in third trimester    Amniotic fluid leaking       P:   Continue close monitoring of maternal/fetal status  Discussed with patient at length continuing MARY ELLEN vs C/S.   Patient has made no change in cervix in 15 hours despite increase in pitocin. Unable to obtain adequate contractions as well.    Patient is requesting to move forward with C/S  Notified .  possible C/S currently on book. C/S to follow

## 2024-03-11 NOTE — PROGRESS NOTES
"LABOR NOTE    S:  Pt resting comfortably with epidural, no complaints.  Family at bedside and supportive.     O: BP (!) 111/44   Pulse 96   Temp 97.7 °F (36.5 °C) (Oral)   Resp 18   Ht 5' 1" (1.549 m)   LMP 2023 (Approximate)   SpO2 100%   Breastfeeding No   BMI 46.49 kg/m²     GENERAL: Calm and appropriate affect  NEURO: Alert, oriented, normal speech  ABDOMEN: Nontender, Fundus palpates soft between UC's.  FHT: Baseline 160, moderate BTBV, positive accels, no decels. Cat 1, reassuring.  CTX: q 1-3 minutes  SVE: 4/80/-2 per RN  IUPC in place: MVUs 140      ASSESSMENT:   29 y.o.  IUP at 37w5d, FHT reassuring/ Cat 1    Patient Active Problem List   Diagnosis    Polycystic ovarian disease    Anxiety    Attention deficit hyperactivity disorder (ADHD), predominantly inattentive type    Dyspareunia    Insulin resistance    Personality disorder    Bilateral carpal tunnel syndrome    Obesity affecting pregnancy in third trimester    Primigravida in third trimester    Amniotic fluid leaking         PLAN:  Continue close Maternal/Fetal monitoring  Pitocin Augmentation per protocol   Recheck 2 hours or PRN    DANGELO Velásquez       "

## 2024-03-11 NOTE — ASSESSMENT & PLAN NOTE
Closely monitor maternal/fetal status  Anticipate progress and     3/11/24 @ 0845:  Epidural in place  VE /-3  Discussed prolong  ruptured, inadequate contractions, and FTP.  Patient desires to  continue attempts for vaginal delivery.   Afebrile   Continue  pitocin  Anticipate

## 2024-03-11 NOTE — PROGRESS NOTES
[facility] - Labor [facility] Delivery  Obstetrics  Labor Progress Note    Patient Name: Maninder Schneider  MRN: [id number]  Admission Date: 3/10/2024  Hospital Length of Stay: 1 days  Attending Physician: Soraya Bain MD  Primary Care Provider: Rico Little MD    Subjective:     Principal Problem:Amniotic fluid leaking    Hospital Course:  Admit to   SROM 0420 3/10  Pt plans NCB, will recheck cervix for change in 2 hours  Augmentation as needed.     3/11/24 @ 0845:  Epidural in place  VE /-3  Discussed prolong  ruptured, inadequate contractions, and FTP.  Patient desires to  continue attempts for vaginal delivery.   Afebrile   Continue  pitocin  Anticipate      Interval History:  Maninder is a 29 y.o.  at 37w5d. She is doing well.     Objective:     Vital Signs (Most Recent):  Temp: 98 °F (36.7 °C) (24 0604)  Pulse: 89 (24 0850)  Resp: 18 (24 0604)  BP: 115/63 (24 0850)  SpO2: 100 % (24 0849) Vital Signs (24h Range):  Temp:  [97.7 °F (36.5 °C)-98.8 °F (37.1 °C)] 98 °F (36.7 °C)  Pulse:  [] 89  Resp:  [18-20] 18  SpO2:  [96 %-100 %] 100 %  BP: (103-141)/(44-78) 115/63        Body mass index is 46.49 kg/m².    FHT: 145Cat 1 (reassuring)  TOCO:  Q 2-3 minutes    Cervical Exam:  Dilation:  4  Effacement:  80  Station: -3  Presentation: Vertex     Significant Labs:  Lab Results   Component Value Date    GROUPTRH A POS 03/10/2024    HEPBSAG Negative 2023    STREPBCULT No Group B Streptococcus isolated 2024       I have personallly reviewed all pertinent lab results from the last 24 hours.    Physical Exam:   Constitutional: She is oriented to person, place, and time. She appears well-developed and well-nourished.       Cardiovascular:  Normal rate.             Pulmonary/Chest: Effort normal. No respiratory distress.        Abdominal: Soft.     Genitourinary:    Vagina and uterus normal.             Musculoskeletal: Moves all extremeties.       Neurological: She is alert  and oriented to person, place, and time.    Skin: Skin is warm and dry.    Psychiatric: She has a normal mood and affect.       Review of Systems  Assessment/Plan:     29 y.o. female  at 37w5d for:    * Amniotic fluid leaking  Closely monitor maternal/fetal status  Anticipate progress and     3/11/24 @ 0845:  Epidural in place  VE /-3  Discussed prolong  ruptured, inadequate contractions, and FTP.  Patient desires to  continue attempts for vaginal delivery.   Afebrile   Continue  pitocin  Anticipate      Primigravida in third trimester  Admit to LD.  Pt plans NCB, support as needed  Anticipate progress and     Obesity affecting pregnancy in third trimester  Lovenox PP          Rosemarie Maria CNM  Obstetrics  [facility] - [facility]

## 2024-03-11 NOTE — PLAN OF CARE
24 1216   OB SCREEN   Assessment Type Discharge Planning Assessment   Source of Information health record   Received Prenatal Care Yes   Any indications/suspicions for None   Is this a teen pregnancy No   Is the baby in NICU No   Indication for adoption/[facility] No   Indication for DME/post-acute needs No   HIV (+) No   Any congenital  disorders No   Fetal demise/ death No

## 2024-03-11 NOTE — L&D DELIVERY NOTE
"[facility] - Labor & Delivery   Section   Operative Note    SUMMARY     Date of Procedure: 3/11/2024     Procedure: Procedure(s) (LRB):   SECTION (N/A)    Surgeon(s) and Role:     * Bella Beckford MD - Primary         Delivery Information for Augusto Schneider    Birth information:  YOB: 2025   Time of birth: 2:21 PM   Sex: male   Head Delivery Date/Time: 3/11/2024  2:21 PM   Delivery type: , Low Transverse   Gestational Age: 37w5d        Delivery Providers    Delivering clinician: Bella Beckford MD   Provider Role    Angela Ordoñez, RN Registered Nurse    Mariya Curry Surgical Tech    Janak, Nafisa CHINCHILLA, [facility] Assist    Ervin Browne, FABIANA Nurse Anesthetist    Livia Ordonez, RN Registered Nurse              Measurements    Weight: 2820 g  Weight (lbs): 6 lb 3.5 oz  Length: 49.5 cm  Length (in): 19.5"  Head circumference: 33.7 cm  Chest circumference: 32.4 cm  Abdominal girth: 29.8 cm         Apgars    Living status: Living  Apgar Component Scores:  1 min.:  5 min.:  10 min.:  15 min.:  20 min.:    Skin color:  0  1  1      Heart rate:  2  2  2      Reflex irritability:  1  2  2      Muscle tone:  1  2  2      Respiratory effort:  1  2  2      Total:  5  9  9      Apgars assigned by: AMOR ORDONEZ RN         Operative Delivery    Forceps attempted?: No  Vacuum extractor attempted?: No         Shoulder Dystocia    Shoulder dystocia present?: No           Presentation    Presentation: Vertex  Position: Middle Occiput Posterior           Interventions/Resuscitation    Method: Bulb Suctioning, Blow By Oxygen, Tactile Stimulation, CPAP, Deep Suctioning       Cord    Vessels: 3 vessels  Complications: None  Delayed Cord Clamping?: Yes  Cord Clamped Date/Time: 3/11/2024  2:23 PM  Cord Blood Disposition: Discarded  Gases Sent?: No  Stem Cell Collection (by MD): No       Placenta    Placenta delivery date/time: 3/11/2024 1423  Placenta removal: Spontaneous  Placenta " appearance: Intact  Placenta disposition: Discarded           Labor Events:       labor: No     Labor Onset Date/Time: 03/10/2024 04:30     Dilation Complete Date/Time:         Start Pushing Date/Time:         Start Pushing Date/Time:       Rupture Date/Time: 03/10/24  0420         Rupture type: SRM (Spontaneous Rupture)         Fluid Amount:       Fluid Color: Clear               steroids:       Antibiotics given for GBS:       Induction:       Indications for induction:        Augmentation: oxytocin     Indications for augmentation: Ineffective Contraction Pattern     Labor complications: Failure to Progress in First Stage     Additional complications:          Cervical ripening:                     Delivery:      Episiotomy: None     Indication for Episiotomy:       Perineal Lacerations: None Repaired:      Periurethral Laceration:   Repaired:     Labial Laceration:   Repaired:     Sulcus Laceration:   Repaired:     Vaginal Laceration:   Repaired:     Cervical Laceration:   Repaired:     Repair suture:       Repair # of packets:       Last Value - EBL - Nursing (mL):       Sum - EBL - Nursing (mL): 0     Last Value - EBL - Anesthesia (mL):      Calculated QBL (mL): 385      Running total QBL (mL): 385      Vaginal Sweep Performed: No     Surgicount Correct: Yes     Vaginal Packing: No Quantity:       Other providers:       Anesthesia    Method: Epidural          Details (if applicable):  Trial of Labor Yes    Categorization: Primary    Priority: Urgent   Indications for : Malposition;Labor dystocia/arrest of active phase of labor   Incision Type: low transverse     Additional  information:  Forceps:    Vacuum:    Breech:    Observed anomalies    Other (Comments):

## 2024-03-11 NOTE — SUBJECTIVE & OBJECTIVE
Interval History:  Maninder is a 29 y.o.  at 37w5d. She is doing well.     Objective:     Vital Signs (Most Recent):  Temp: 98 °F (36.7 °C) (24 06)  Pulse: 89 (24 0850)  Resp: 18 (24 06)  BP: 115/63 (24 0850)  SpO2: 100 % (24 0849) Vital Signs (24h Range):  Temp:  [97.7 °F (36.5 °C)-98.8 °F (37.1 °C)] 98 °F (36.7 °C)  Pulse:  [] 89  Resp:  [18-20] 18  SpO2:  [96 %-100 %] 100 %  BP: (103-141)/(44-78) 115/63        Body mass index is 46.49 kg/m².    FHT: 145Cat 1 (reassuring)  TOCO:  Q 2-3 minutes    Cervical Exam:  Dilation:  4  Effacement:  80  Station: -3  Presentation: Vertex     Significant Labs:  Lab Results   Component Value Date    GROUPTRH A POS 03/10/2024    HEPBSAG Negative 2023    STREPBCULT No Group B Streptococcus isolated 2024       I have personallly reviewed all pertinent lab results from the last 24 hours.    Physical Exam:   Constitutional: She is oriented to person, place, and time. She appears well-developed and well-nourished.       Cardiovascular:  Normal rate.             Pulmonary/Chest: Effort normal. No respiratory distress.        Abdominal: Soft.     Genitourinary:    Vagina and uterus normal.             Musculoskeletal: Moves all extremeties.       Neurological: She is alert and oriented to person, place, and time.    Skin: Skin is warm and dry.    Psychiatric: She has a normal mood and affect.       Review of Systems

## 2024-03-11 NOTE — ANESTHESIA PROCEDURE NOTES
Epidural    Patient location during procedure: OB   Reason for block: primary anesthetic   Reason for block: labor analgesia requested by patient and obstetrician  Diagnosis: IUP   Start time: 3/11/2024 1:16 AM  Timeout: 3/11/2024 1:16 AM  End time: 3/11/2024 1:29 AM    Staffing  Performing Provider: Evan Kelley Jr. CRNA  Authorizing Provider: Evan Bedoya II, MD    Staffing  Performed by: Evan Kelley Jr. CRNA  Authorized by: Evan Bedoya II, MD        Preanesthetic Checklist  Completed: patient identified, IV checked, site marked, risks and benefits discussed, surgical consent, monitors and equipment checked, pre-op evaluation, timeout performed, anesthesia consent given, hand hygiene performed and patient being monitored  Preparation  Patient position: sitting  Prep: ChloraPrep  Patient monitoring: Pulse Ox and Blood Pressure  Reason for block: primary anesthetic   Epidural  Skin Anesthetic: lidocaine 1%  Skin Wheal: 3 mL  Administration type: continuous  Approach: midline  Interspace: L3-4    Injection technique: MARY ALICE air  Needle and Epidural Catheter  Needle type: Tuohy   Needle gauge: 17  Needle length: 3.5 inches  Needle insertion depth: 7 cm  Catheter size: 19 G  Catheter at skin depth: 15 cm  Insertion Attempts: 1  Additional Documentation: incremental injection, no paresthesia on injection, no significant pain on injection, negative aspiration for heme and CSF, no signs/symptoms of IV or SA injection and no significant complaints from patient  Needle localization: anatomical landmarks  Assessment  Upper dermatomal levels - Left: T10  Right: T10   Dermatomal levels determined by alcohol wipe and pinch or prick  Ease of block: easy  Patient's tolerance of the procedure: comfortable throughout block and no complaints No inadvertent dural puncture with Tuohy.  Dural puncture not performed with spinal needle

## 2024-03-11 NOTE — OP NOTE
OPERATIVE NOTE    DATE OF PROCEDURE:  2024    PREOPERATIVE DIAGNOSIS:    1.  Pregnancy at 37w5d EGA  2.  Prolonged premature rupture of membranes  3.  Failure to progress in labor    POSTOPERATIVE DIAGNOSIS   1.  Pregnancy at 37w5d EGA  2.  Prolonged premature rupture of membranes  3.  Failure to progress in labor  4.  Viable male infant in OP presentation    OPERATIVE PROCEDURE:  Primary Low Transverse  Section    SURGEON:  Bella Beckford MD    ASSISTANT:  Nafisa Briceno CST    ANESTHESIA:  Epidural    ESTIMATED BLOOD LOSS:  500 ml    FINDINGS:  Normal uterus, tubes, and ovaries    PROSTETICS/DEVICES: None    COMPLICATIONS:  None    SPECIMEN:  None     PROCEDURE IN DETAIL:   The procedure was explained to the patient and informed consent was obtained. The patient was brought to the operating room where epidural anesthesia was administered. A sarmiento catheter was placed, and she was prepped and draped in the usual sterile manner. A time out was performed. A pfannensteil skin incision was made with the scalpel and the incision was extended sharply to the rectus fascia. The fascial incision was extended bilaterally with niño scissors and grasped with [facility] clamps. The fascia was dissected off of the underlying rectus muscles both superiorly and inferiorly. The rectus muscles were divided in the midline with sharp and blunt dissection. The peritoneum was grasped between two hemostats and entered sharply. The peritoneal incision was extended bluntly. A bladder retractor was placed. The lower uterine segment was incised in a transverse fashion using the scapel. This incision was extended with blunt dissection.  Membranes were ruptured with clear fluid noted. The infant was delivered from the vertex position without difficulty.  Infant was noted to be in an asynclitic OP presentation. The nose and mouth were suctioned and the umbilical cord was doubly clamped and cut. The infant was handed off to the  nursery staff in attendance. The placenta was delivered spontaneously and the uterus was cleared of all clots and debris. The uterine incision was closed with a number one Chromic suture in a running locking fashion. A single layer closure was performed. Good hemostasis was noted. The pelvis was irrigated and was well suctioned of all remaining blood clots. The rectus muscles were closed with 2-0 Vicryl suture with interrupted figure of eight sutures. The fascia was closed with a 0 looped PDS suture in a running fashion.  The subcutaneous tissue was closed with 2-0 plain gut suture, with interrupted sutures. The skin incision was well irrigated and closed with 4-0 Monocryl in a subcuticular fashion. Steri strips and a sterile dressing were placed over the incision. The patient tolerated the procedure well and was brought to the recovery room in stable condition. All counts were correct x 3.

## 2024-03-11 NOTE — PROGRESS NOTES
"LABOR NOTE    S:  Pt resting comfortably, no complaints.  Family at bedside and supportive.     O: /74   Pulse 73   Temp 98.1 °F (36.7 °C) (Oral)   Resp 18   Ht 5' 1" (1.549 m)   LMP 2023 (Approximate)   SpO2 98%   Breastfeeding No   BMI 46.49 kg/m²     GENERAL: Calm and appropriate affect  NEURO: Alert, oriented, normal speech  ABDOMEN: Nontender, Fundus palpates soft between UC's.  FHT: Baseline 135, moderate BTBV, positive accels, no decels. Cat 1, reassuring.  CTX: q 3-4 minutes  SVE: 380/-3      ASSESSMENT:   29 y.o.  IUP at 37w4d, FHT reassuring/ Cat 1    Patient Active Problem List   Diagnosis    Polycystic ovarian disease    Anxiety    Attention deficit hyperactivity disorder (ADHD), predominantly inattentive type    Dyspareunia    Insulin resistance    Personality disorder    Bilateral carpal tunnel syndrome    Obesity affecting pregnancy in third trimester    Primigravida in third trimester    Amniotic fluid leaking         PLAN:  Continue close Maternal/Fetal monitoring  Pitocin Augmentation per protocol - currently infusing at 14mu  Recheck 2 hours or PRN  Epidural per anesthesia at pt's request  Anticipate progress and     DANGELO Velásquez     "

## 2024-03-11 NOTE — PROGRESS NOTES
"LABOR NOTE    S:  Pt currently getting epidural.  Family at bedside and supportive.     O: /74   Pulse 74   Temp 97.7 °F (36.5 °C) (Oral)   Resp 18   Ht 5' 1" (1.549 m)   LMP 2023 (Approximate)   SpO2 98%   Breastfeeding No   BMI 46.49 kg/m²     GENERAL: Calm and appropriate affect  NEURO: Alert, oriented, normal speech  ABDOMEN: Nontender, Fundus palpates soft between UC's.  FHT: Baseline 150, moderate BTBV, positive accels, no decels. Cat 1, reassuring.  CTX: q 3-4 minutes  SVE 4/80/-2 per RN  IUPC inserted      ASSESSMENT:   29 y.o.  IUP at 37w5d, FHT reassuring/ Cat 1    Patient Active Problem List   Diagnosis    Polycystic ovarian disease    Anxiety    Attention deficit hyperactivity disorder (ADHD), predominantly inattentive type    Dyspareunia    Insulin resistance    Personality disorder    Bilateral carpal tunnel syndrome    Obesity affecting pregnancy in third trimester    Primigravida in third trimester    Amniotic fluid leaking         PLAN:  Continue close Maternal/Fetal monitoring  Pitocin Augmentation per protocol   Recheck 2 hours or PRN    DANGELO Velásquez  "

## 2024-03-12 PROBLEM — O42.90 AMNIOTIC FLUID LEAKING: Status: RESOLVED | Noted: 2024-03-10 | Resolved: 2024-03-12

## 2024-03-12 PROBLEM — Z34.03 PRIMIGRAVIDA IN THIRD TRIMESTER: Status: RESOLVED | Noted: 2024-03-06 | Resolved: 2024-03-12

## 2024-03-12 LAB
ANION GAP SERPL CALC-SCNC: 9 MMOL/L (ref 8–16)
BUN SERPL-MCNC: 8 MG/DL (ref 6–20)
CALCIUM SERPL-MCNC: 8.8 MG/DL (ref 8.7–10.5)
CHLORIDE SERPL-SCNC: 108 MMOL/L (ref 95–110)
CO2 SERPL-SCNC: 18 MMOL/L (ref 23–29)
CREAT SERPL-MCNC: 0.7 MG/DL (ref 0.5–1.4)
EST. GFR  (NO RACE VARIABLE): >60 ML/MIN/1.73 M^2
GLUCOSE SERPL-MCNC: 98 MG/DL (ref 70–110)
POTASSIUM SERPL-SCNC: 4.1 MMOL/L (ref 3.5–5.1)
SODIUM SERPL-SCNC: 135 MMOL/L (ref 136–145)

## 2024-03-12 PROCEDURE — 99231 SBSQ HOSP IP/OBS SF/LOW 25: CPT | Mod: 95,,, | Performed by: OBSTETRICS & GYNECOLOGY

## 2024-03-12 PROCEDURE — 80048 BASIC METABOLIC PNL TOTAL CA: CPT | Performed by: OBSTETRICS & GYNECOLOGY

## 2024-03-12 PROCEDURE — 63600175 PHARM REV CODE 636 W HCPCS: Performed by: OBSTETRICS & GYNECOLOGY

## 2024-03-12 PROCEDURE — 11000001 HC ACUTE MED/SURG PRIVATE ROOM

## 2024-03-12 PROCEDURE — 36415 COLL VENOUS BLD VENIPUNCTURE: CPT | Performed by: OBSTETRICS & GYNECOLOGY

## 2024-03-12 PROCEDURE — 25000003 PHARM REV CODE 250: Performed by: OBSTETRICS & GYNECOLOGY

## 2024-03-12 RX ORDER — ENOXAPARIN SODIUM 100 MG/ML
40 INJECTION SUBCUTANEOUS 2 TIMES DAILY
Status: DISCONTINUED | OUTPATIENT
Start: 2024-03-12 | End: 2024-03-13 | Stop reason: HOSPADM

## 2024-03-12 RX ADMIN — OXYCODONE HYDROCHLORIDE AND ACETAMINOPHEN 1 TABLET: 5; 325 TABLET ORAL at 04:03

## 2024-03-12 RX ADMIN — IBUPROFEN 800 MG: 800 TABLET, FILM COATED ORAL at 12:03

## 2024-03-12 RX ADMIN — IBUPROFEN 800 MG: 800 TABLET, FILM COATED ORAL at 09:03

## 2024-03-12 RX ADMIN — IBUPROFEN 800 MG: 800 TABLET, FILM COATED ORAL at 05:03

## 2024-03-12 RX ADMIN — ENOXAPARIN SODIUM 40 MG: 40 INJECTION SUBCUTANEOUS at 02:03

## 2024-03-12 RX ADMIN — MAGNESIUM HYDROXIDE 2400 MG: 400 SUSPENSION ORAL at 10:03

## 2024-03-12 RX ADMIN — IBUPROFEN 800 MG: 800 TABLET, FILM COATED ORAL at 01:03

## 2024-03-12 RX ADMIN — DOCUSATE SODIUM 100 MG: 100 CAPSULE, LIQUID FILLED ORAL at 10:03

## 2024-03-12 RX ADMIN — OXYCODONE AND ACETAMINOPHEN 1 TABLET: 10; 325 TABLET ORAL at 04:03

## 2024-03-12 RX ADMIN — OXYCODONE AND ACETAMINOPHEN 1 TABLET: 10; 325 TABLET ORAL at 12:03

## 2024-03-12 RX ADMIN — OXYCODONE AND ACETAMINOPHEN 1 TABLET: 10; 325 TABLET ORAL at 09:03

## 2024-03-12 NOTE — PLAN OF CARE
Discussed POC with patient and , verbalized understanding.  Safety precautions maintained.  No s/s of acute distress.   Purposeful rounding continued this shift.  Vital signs continued per orders.  Chart check complete.  Feeding log checked, documented.  Mother bonding with baby/participating in care.   Patient education and care continued this shift.    Problem: Adult Inpatient Plan of Care  Goal: Patient-Specific Goal (Individualized)  Outcome: Ongoing, Progressing     Problem: Adult Inpatient Plan of Care  Goal: Optimal Comfort and Wellbeing  Outcome: Ongoing, Progressing     Problem: Adult Inpatient Plan of Care  Goal: Readiness for Transition of Care  Outcome: Ongoing, Progressing     Problem:  Fall Injury Risk  Goal: Absence of Fall, Infant Drop and Related Injury  Outcome: Ongoing, Progressing     Problem: Bariatric Environmental Safety  Goal: Safety Maintained with Care  Outcome: Ongoing, Progressing     Problem: Bleeding (Labor)  Goal: Hemostasis  Outcome: Ongoing, Progressing

## 2024-03-12 NOTE — PROGRESS NOTES
[facility] ([facility])  Obstetrics  Postpartum Progress Note    Patient Name: Maninder Schneider  MRN: [id number]  Admission Date: 3/10/2024  Hospital Length of Stay: 2 days  Attending Physician: Soraya Bain MD  Primary Care Provider: Rico Little MD    Subjective:     Principal Problem:S/P primary low transverse     Hospital Course:  Admit to   SROM 0420 3/10  Pt plans NCB, will recheck cervix for change in 2 hours  Augmentation as needed.     3/11/24 @ 0845:  Epidural in place  VE /3  Discussed prolong  ruptured, inadequate contractions, and FTP.  Patient desires to  continue attempts for vaginal delivery.   Afebrile   Continue  pitocin  Anticipate    2024--primary c/section for failure to progress, mother and infant transferred to [facility] for routine postpartum care      Interval History:   Pod #1    She is doing well this morning. She is tolerating a regular diet without nausea or vomiting. She is voiding spontaneously. She is ambulating. She has not passed flatus, and has not a BM. Vaginal bleeding is mild. She denies fever or chills. Abdominal pain is mild and controlled with oral medications. She Is breastfeeding.     Objective:     Vital Signs (Most Recent):  Temp: 97.9 °F (36.6 °C) (24)  Pulse: 82 (24)  Resp: 18 (24)  BP: (!) 100/50 (24)  SpO2: 100 % (24) Vital Signs (24h Range):  Temp:  [97.9 °F (36.6 °C)-98.7 °F (37.1 °C)] 97.9 °F (36.6 °C)  Pulse:  [] 82  Resp:  [18] 18  SpO2:  [97 %-100 %] 100 %  BP: (100-156)/(50-86) 100/50        Body mass index is 46.49 kg/m².      Intake/Output Summary (Last 24 hours) at 3/12/2024 0919  Last data filed at 3/12/2024 0500  Gross per 24 hour   Intake 29.44 ml   Output 2160 ml   Net -2130.56 ml         Significant Labs:  Lab Results   Component Value Date    GROUPTRH A POS 03/10/2024    HEPBSAG Negative 2023    STREPBCULT No Group B Streptococcus isolated 2024  "    No results for input(s): "HGB", "HCT" in the last 48 hours.    I have personallly reviewed all pertinent lab results from the last 24 hours.  Recent Lab Results       None            Physical Exam:   Constitutional: She is oriented to person, place, and time. She appears well-developed.    HENT:   Head: Normocephalic.    Eyes: Pupils are equal, round, and reactive to light.     Cardiovascular:  Normal rate and regular rhythm.             Pulmonary/Chest: Effort normal and breath sounds normal.        Abdominal: Soft. Bowel sounds are normal. She exhibits no abdominal incision.     Genitourinary:    Genitourinary Comments: Ut--firm, non tender             Musculoskeletal: Normal range of motion.       Neurological: She is alert and oriented to person, place, and time.    Skin: Skin is warm and dry.    Psychiatric: She has a normal mood and affect. Her behavior is normal. Judgment and thought content normal.       Review of Systems  Assessment/Plan:     29 y.o. female  for:    * S/P primary low transverse   2024  Pod #1 s/p primary c/section for failure to progress  Afebrile  Continue dietary advances, encouraged ambulation, deep breathing exercises  Aware ok to shower  Anticipate discharge in 1-2 days      Obesity affecting pregnancy in third trimester  Lovenox PP        Disposition: As patient meets milestones, will plan to discharge in 1-2 days.    Soraya Bain MD  Obstetrics  [facility] ([facility])      "

## 2024-03-12 NOTE — ASSESSMENT & PLAN NOTE
03/12/2024  Pod #1 s/p primary c/section for failure to progress  Afebrile  Continue dietary advances, encouraged ambulation, deep breathing exercises  Aware ok to shower  Anticipate discharge in 1-2 days

## 2024-03-12 NOTE — PROGRESS NOTES
Pharmacist Renal Dose Adjustment Note    Maninder Schneider is a 29 y.o. female being treated with the medication enoxaparin.     Patient Data:    Vital Signs (Most Recent):  Temp: 98.1 °F (36.7 °C) (03/12/24 1109)  Pulse: 100 (03/12/24 1109)  Resp: 18 (03/12/24 1109)  BP: (!) 112/55 (03/12/24 1109)  SpO2: 100 % (03/12/24 0425) Vital Signs (72h Range):  Temp:  [97.6 °F (36.4 °C)-98.8 °F (37.1 °C)]   Pulse:  []   Resp:  [18-20]   BP: (100-156)/(44-86)   SpO2:  [96 %-100 %]      Recent Labs   Lab 03/12/24  1039   CREATININE 0.7     Serum creatinine: 0.7 mg/dL 03/12/24 1039  Estimated creatinine clearance: 136 mL/min  BMI = 46.49    Enoxaparin 40 mg subq every 24 hours will be changed to enoxaparin 40 mg subq every 12 hours per renal dose protocol for BMI 40-50 and CrCl > 30 ml/min.       Thank you,  Pharmacist's Name: Kirit Laughlin

## 2024-03-12 NOTE — ANESTHESIA POSTPROCEDURE EVALUATION
Anesthesia Post Evaluation    Patient: Maninder Schneider    Procedure(s) Performed: Procedure(s) (LRB):   SECTION (N/A)    Final Anesthesia Type: epidural      Patient location during evaluation: labor & delivery  Patient participation: Yes- Able to Participate  Level of consciousness: awake and alert and oriented  Post-procedure vital signs: reviewed and stable  Pain management: adequate  Airway patency: patent    PONV status at discharge: No PONV  Anesthetic complications: no      Cardiovascular status: blood pressure returned to baseline, stable and hemodynamically stable  Respiratory status: unassisted, room air and spontaneous ventilation  Hydration status: euvolemic  Follow-up not needed.              Vitals Value Taken Time   /50 24 0817   Temp 36.6 °C (97.9 °F) 24 0817   Pulse 82 24 0817   Resp 18 24 0817   SpO2 100 % 24 0425         Event Time   Out of Recovery 2024 17:10:00         Pain/Jeff Score: Pain Rating Prior to Med Admin: 3 (3/12/2024  5:20 AM)  Pain Rating Post Med Admin: 3 (3/12/2024  1:00 AM)

## 2024-03-12 NOTE — TRANSFER OF CARE
"Anesthesia Transfer of Care Note    Patient: Maninder Schneider    Procedure(s) Performed: Procedure(s) (LRB):   SECTION (N/A)    Patient location: Labor and Delivery    Anesthesia Type: epidural    Transport from OR: Transported from OR on room air with adequate spontaneous ventilation    Post pain: adequate analgesia    Post assessment: no apparent anesthetic complications    Post vital signs: stable    Level of consciousness: awake, alert and oriented    Nausea/Vomiting: no nausea/vomiting    Complications: none    Transfer of care protocol was followed      Last vitals: Visit Vitals  BP (!) 100/50 (Patient Position: Sitting)   Pulse 82   Temp 36.6 °C (97.9 °F) (Oral)   Resp 18   Ht 5' 1" (1.549 m)   LMP 2023 (Approximate)   SpO2 100%   Breastfeeding No   BMI 46.49 kg/m²     "

## 2024-03-12 NOTE — LACTATION NOTE
Mother reports that the baby has been too sleepy to latch. Mother reports that she has been syringe feeding expressed breast milk and formula. NNP Alysa at bedside and advises mother to breastfeed and supplement infant with at least 15 mls of expressed breast milk or formula after each nursing session due to low blood glucose levels. Mother reports that the baby has breast fed one time since delivery.    Infant swaddled and asleep at this time. Removed infant's blankets and changed infants wet and dirty diaper and infant began showing feeding cues. Helped mother to settle in a football hold position on the right breast. Reviewed deep asymmetric latch and proper positioning. Mother is able to demonstrate back and deep latch easily obtained. Audible swallows noted, and mother denies pain or discomfort. Baby fed until content, and nipple shape and color is WDL upon unlatching. Infant fed for 6 minutes at this time. Offered left breast and infant remains asleep.    Mother chooses to supplement infant via bottle. Mother taught how to safely feed infant via this method. Demonstrated by nurse and mother return demonstrates proper and safe usage.   Because baby is being supplemented away from the breast, mother was:   - informed that breastfeeding support and assistance is available as needed  - encouraged to express milk from both breasts each time a supplement is given  - encouraged to use her own collected milk as a first choice for supplementation  Mother was encouraged to request assistance as needed and voices understanding.     Reviewed proper usage of the breast pump and to adjust suction according to comfort level. Reviewed with mother frequency and duration of pumping in order to promote and maintain full milk supply. Hands on pumping technique reviewed. Instructed mother on cleaning of breast pump parts. Reviewed proper milk handling, collection, storage, and transportation. Voices understanding.      Lactation  packet reviewed.  Discussed early feeding cues and encouraged mother to feed baby in response to those cues. Encouraged on demand feedings and skin to skin.  Reviewed normal feeding expectations of 8 or more feedings per 24 hour period, cues that babies use to signal hunger and satiety and cluster feeding. Discussed the adequacy of colostrum and baby belly size for the first 3 days of life along with expected output.     Discussed risks of introducing a pacifier or artificial nipple and discussed the AAP recommendation to avoid the use of pacifiers until 1 month of age for breastfeeding infants.     Mother reports that she ordered a breast pump for home usage through her insurance provider and it has been shipped. Mother is unsure of the brand.    Mother states understanding and verbalized appropriate recall. Encouraged mother to call for assistance when desired or when infant is showing signs of hunger, contact number provided, mother verbalizes understanding.

## 2024-03-12 NOTE — SUBJECTIVE & OBJECTIVE
"Interval History:   Pod #1    She is doing well this morning. She is tolerating a regular diet without nausea or vomiting. She is voiding spontaneously. She is ambulating. She has not passed flatus, and has not a BM. Vaginal bleeding is mild. She denies fever or chills. Abdominal pain is mild and controlled with oral medications. She Is breastfeeding.     Objective:     Vital Signs (Most Recent):  Temp: 97.9 °F (36.6 °C) (03/12/24 0817)  Pulse: 82 (03/12/24 0817)  Resp: 18 (03/12/24 0817)  BP: (!) 100/50 (03/12/24 0817)  SpO2: 100 % (03/12/24 0425) Vital Signs (24h Range):  Temp:  [97.9 °F (36.6 °C)-98.7 °F (37.1 °C)] 97.9 °F (36.6 °C)  Pulse:  [] 82  Resp:  [18] 18  SpO2:  [97 %-100 %] 100 %  BP: (100-156)/(50-86) 100/50        Body mass index is 46.49 kg/m².      Intake/Output Summary (Last 24 hours) at 3/12/2024 0919  Last data filed at 3/12/2024 0500  Gross per 24 hour   Intake 29.44 ml   Output 2160 ml   Net -2130.56 ml         Significant Labs:  Lab Results   Component Value Date    GROUPTRH A POS 03/10/2024    HEPBSAG Negative 08/08/2023    STREPBCULT No Group B Streptococcus isolated 03/06/2024     No results for input(s): "HGB", "HCT" in the last 48 hours.    I have personallly reviewed all pertinent lab results from the last 24 hours.  Recent Lab Results       None            Physical Exam:   Constitutional: She is oriented to person, place, and time. She appears well-developed.    HENT:   Head: Normocephalic.    Eyes: Pupils are equal, round, and reactive to light.     Cardiovascular:  Normal rate and regular rhythm.             Pulmonary/Chest: Effort normal and breath sounds normal.        Abdominal: Soft. Bowel sounds are normal. She exhibits no abdominal incision.     Genitourinary:    Genitourinary Comments: Ut--firm, non tender             Musculoskeletal: Normal range of motion.       Neurological: She is alert and oriented to person, place, and time.    Skin: Skin is warm and dry.  "   Psychiatric: She has a normal mood and affect. Her behavior is normal. Judgment and thought content normal.       Review of Systems

## 2024-03-13 VITALS
DIASTOLIC BLOOD PRESSURE: 58 MMHG | TEMPERATURE: 98 F | WEIGHT: 246.06 LBS | HEART RATE: 89 BPM | HEIGHT: 61 IN | OXYGEN SATURATION: 100 % | SYSTOLIC BLOOD PRESSURE: 102 MMHG | BODY MASS INDEX: 46.46 KG/M2 | RESPIRATION RATE: 18 BRPM

## 2024-03-13 PROBLEM — O42.90 AMNIOTIC FLUID LEAKING: Status: RESOLVED | Noted: 2024-03-10 | Resolved: 2024-03-13

## 2024-03-13 PROCEDURE — 99238 HOSP IP/OBS DSCHRG MGMT 30/<: CPT | Mod: ,,, | Performed by: OBSTETRICS & GYNECOLOGY

## 2024-03-13 PROCEDURE — 25000003 PHARM REV CODE 250: Performed by: OBSTETRICS & GYNECOLOGY

## 2024-03-13 PROCEDURE — 63600175 PHARM REV CODE 636 W HCPCS: Performed by: OBSTETRICS & GYNECOLOGY

## 2024-03-13 RX ORDER — FUROSEMIDE 20 MG/1
20 TABLET ORAL ONCE
Status: COMPLETED | OUTPATIENT
Start: 2024-03-13 | End: 2024-03-13

## 2024-03-13 RX ORDER — OXYCODONE AND ACETAMINOPHEN 5; 325 MG/1; MG/1
1 TABLET ORAL EVERY 4 HOURS PRN
Qty: 20 TABLET | Refills: 0 | Status: SHIPPED | OUTPATIENT
Start: 2024-03-13 | End: 2024-04-12

## 2024-03-13 RX ORDER — IBUPROFEN 800 MG/1
800 TABLET ORAL EVERY 8 HOURS PRN
Qty: 30 TABLET | Refills: 0 | Status: SHIPPED | OUTPATIENT
Start: 2024-03-13 | End: 2024-04-12

## 2024-03-13 RX ADMIN — ENOXAPARIN SODIUM 40 MG: 40 INJECTION SUBCUTANEOUS at 03:03

## 2024-03-13 RX ADMIN — IBUPROFEN 800 MG: 800 TABLET, FILM COATED ORAL at 05:03

## 2024-03-13 RX ADMIN — OXYCODONE HYDROCHLORIDE AND ACETAMINOPHEN 1 TABLET: 5; 325 TABLET ORAL at 12:03

## 2024-03-13 RX ADMIN — DOCUSATE SODIUM 100 MG: 100 CAPSULE, LIQUID FILLED ORAL at 08:03

## 2024-03-13 RX ADMIN — OXYCODONE AND ACETAMINOPHEN 1 TABLET: 10; 325 TABLET ORAL at 01:03

## 2024-03-13 RX ADMIN — OXYCODONE AND ACETAMINOPHEN 1 TABLET: 10; 325 TABLET ORAL at 06:03

## 2024-03-13 RX ADMIN — FUROSEMIDE 20 MG: 20 TABLET ORAL at 09:03

## 2024-03-13 NOTE — DISCHARGE SUMMARY
[facility] ([facility])  Obstetrics  Discharge Summary      Patient Name: Maninder Schneider  MRN: [id number]  Admission Date: 3/10/2024  Hospital Length of Stay: 3 days  Discharge Date and Time:  2024 10:12 AM  Attending Physician: Soraya Bain MD   Discharging Provider: Fernando Ellison MD   Primary Care Provider: Rico Little MD    HPI: 28y/o  37w4d presents to LD for leaking of amniotic fluid around 0420 this am. Nitrizine positive on arrival to triage with large amount of clear fluid with vernix noted.         Procedure(s) (LRB):   SECTION (N/A)     Hospital Course:   Admit to LD  SROM 0420 3/10  Pt plans NCB, will recheck cervix for change in 2 hours  Augmentation as needed.     3/11/24 @ 0845:  Epidural in place  VE /-3  Discussed prolong  ruptured, inadequate contractions, and FTP.  Patient desires to  continue attempts for vaginal delivery.   Afebrile   Continue  pitocin  Anticipate    2024--primary c/section for failure to progress, mother and infant transferred to [facility] for routine postpartum care  Post-operative course was unremarkable and pt recovered well.  Pt was discharged on POD#2 upon meeting all discharge criteria.  Pt was counseled on post-operative care and warning sign instructions prior to her discharge.           Final Active Diagnoses:    Diagnosis Date Noted POA    PRINCIPAL PROBLEM:  S/P primary low transverse  [Z98.891] 2024 Not Applicable    Obesity affecting pregnancy in third trimester [O99.213] 2024 Yes      Problems Resolved During this Admission:    Diagnosis Date Noted Date Resolved POA    Amniotic fluid leaking [O42.90] 03/10/2024 2024 Yes        Significant Diagnostic Studies: Labs: All labs within the past 24 hours have been reviewed      Feeding Method: breast    Immunizations       Date Immunization Status Dose Route/Site Given by    12 0000 Influenza - Trivalent - PF (ADULT) Given       24 1601 MMR  "Incomplete 0.5 mL Subcutaneous/             Delivery:    Episiotomy: None   Lacerations: None   Repair suture:     Repair # of packets:     Blood loss (ml):       Birth information:  YOB: 2025   Time of birth: 2:21 PM   Sex: male   Delivery type: , Low Transverse   Gestational Age: 37w5d     Measurements    Weight: 2820 g  Weight (lbs): 6 lb 3.5 oz  Length: 49.5 cm  Length (in): 19.5"  Head circumference: 33.7 cm  Chest circumference: 32.4 cm  Abdominal girth: 29.8 cm         Delivery Clinician: Delivery Providers    Delivering clinician: Bella Beckford MD   Provider Role    Angela Ordoñez RN Registered Nurse    Mariya Curry Surgical Tech    Janak, Nafisa CHINCHILLA, [facility] Assist    Ervin Browne, FABIANA Nurse Anesthetist    Livia Ordonez RN Registered Nurse             Additional  information:  Forceps:    Vacuum:    Breech:    Observed anomalies      Living?:     Apgars    Living status: Living  Apgar Component Scores:  1 min.:  5 min.:  10 min.:  15 min.:  20 min.:    Skin color:  0  1  1      Heart rate:  2  2  2      Reflex irritability:  1  2  2      Muscle tone:  1  2  2      Respiratory effort:  1  2  2      Total:  5  9  9      Apgars assigned by: AMOR ORDONEZ RN         Placenta: Delivered:       appearance  Pending Diagnostic Studies:       None            Discharged Condition: stable    Disposition: Home or Self Care    Follow Up:   Follow-up Information       Fred Pichardo NP Follow up in 1 week(s).    Specialty: Obstetrics and Gynecology  Why: dressing removal --corby  Contact information:  [address] [facility] [address] [phone]               Vasquez Freeman CNM Follow up in 4 week(s).    Specialty: Obstetrics and Gynecology  Why: post op check (corby)  Contact information:  [address]  [place] [address] [phone]                           Patient Instructions:      BREAST PUMP FOR HOME USE     Order Specific Question " Answer Comments   Type of pump: Electric    Weight: 111.6 kg    Length of need (1-99 months): 99      Diet Adult Regular     Other restrictions (specify):   Order Comments: Light activity and pelvic rest x 6 weeks     Notify your health care provider if you experience any of the following:  increased confusion or weakness     Notify your health care provider if you experience any of the following:  persistent dizziness, light-headedness, or visual disturbances     Notify your health care provider if you experience any of the following:  worsening rash     Notify your health care provider if you experience any of the following:  severe persistent headache     Notify your health care provider if you experience any of the following:  difficulty breathing or increased cough     Notify your health care provider if you experience any of the following:  redness, tenderness, or signs of infection (pain, swelling, redness, odor or green/yellow discharge around incision site)     Notify your health care provider if you experience any of the following:  severe uncontrolled pain     Notify your health care provider if you experience any of the following:  persistent nausea and vomiting or diarrhea     Notify your health care provider if you experience any of the following:  temperature >100.4     Leave dressing on - Keep it clean, dry, and intact until clinic visit     Medications:  Current Discharge Medication List        START taking these medications    Details   ibuprofen (ADVIL,MOTRIN) 800 MG tablet Take 1 tablet (800 mg total) by mouth every 8 (eight) hours as needed for Pain.  Qty: 30 tablet, Refills: 0    Associated Diagnoses: S/P primary low transverse       oxyCODONE-acetaminophen (PERCOCET) 5-325 mg per tablet Take 1 tablet by mouth every 4 (four) hours as needed for Pain.  Qty: 20 tablet, Refills: 0    Comments: Quantity prescribed more than 7 day supply? No  Associated Diagnoses: S/P primary low transverse             STOP taking these medications       aspirin 81 MG Chew Comments:   Reason for Stopping:               Fernando Ellison MD  Obstetrics  [facility] ([facility])

## 2024-03-13 NOTE — SUBJECTIVE & OBJECTIVE
"Interval History:     She is doing well this morning. She is tolerating a regular diet without nausea or vomiting. She is voiding spontaneously. She is ambulating. She has passed flatus, and has not a BM. Vaginal bleeding is mild. She denies fever or chills. Abdominal pain is moderate and controlled with oral medications. She Is breastfeeding. She desires circumcision for her male baby: yes.    Objective:     Vital Signs (Most Recent):  Temp: 97.8 °F (36.6 °C) (03/13/24 0821)  Pulse: 83 (03/13/24 0821)  Resp: 14 (03/13/24 0821)  BP: (!) 98/51 (03/13/24 0821)  SpO2: 100 % (03/13/24 0515) Vital Signs (24h Range):  Temp:  [97.8 °F (36.6 °C)-98.1 °F (36.7 °C)] 97.8 °F (36.6 °C)  Pulse:  [] 83  Resp:  [14-18] 14  SpO2:  [100 %] 100 %  BP: ()/(51-66) 98/51     Weight: 111.6 kg (246 lb 0.5 oz)  Body mass index is 46.49 kg/m².    No intake or output data in the 24 hours ending 03/13/24 0903      Significant Labs:  Lab Results   Component Value Date    GROUPTRH A POS 03/10/2024    HEPBSAG Negative 08/08/2023    STREPBCULT No Group B Streptococcus isolated 03/06/2024     No results for input(s): "HGB", "HCT" in the last 48 hours.    I have personallly reviewed all pertinent lab results from the last 24 hours.    Physical Exam:   Constitutional: She is oriented to person, place, and time. She appears well-developed and well-nourished. No distress.       Cardiovascular:  Normal rate, regular rhythm and normal heart sounds.             Pulmonary/Chest: Effort normal and breath sounds normal.        Abdominal: Soft. Bowel sounds are normal. She exhibits abdominal incision (dressing in place). She exhibits no distension and no mass. There is abdominal tenderness (appropriate). There is no rebound and no guarding. No hernia.     Genitourinary: There is bleeding in the vagina. Uterus is not tender.           Musculoskeletal: Normal range of motion and moves all extremeties. Edema present.       Neurological: She is alert " and oriented to person, place, and time.    Skin: Skin is warm and dry.    Psychiatric: She has a normal mood and affect. Her behavior is normal. Thought content normal.       Review of Systems

## 2024-03-13 NOTE — DISCHARGE INSTRUCTIONS
"Mother Self Care:    Activity: Avoid strenuous exercise and get adequate rest.  No driving until the physician consent given.  Emotional Changes: Most women find birth to be a time of great emotional upheaval.  Sense of loss, mood swings, fatigue, anxiety, and feeling "let down" are common.  If feelings worsen or last more than a week, call your physician.  Breast Care/Breastfeeding: Wear a bra for comfort.  Keep nipples dry and apply your own breast milk or lanolin cream as needed for soreness.  Engorgement can be relieved with warm, moist heat before feedings.  You may also take Ibuprofen.  Breast Care/Bottle Feeding: Wear support bra 24 hours a day for one week.  Avoid stimulation to breasts.  You may use ice packs for discomfort.  Marilee-Care/Vaginal Bleeding: Remember to use your marilee-bottle after urinating.  Your flow will change from red, to pink, to yellow/white color over a period of 2 weeks.  Menstruation will return in 3-8 weeks, or longer if breastfeeding.   Section/Tubal Ligation: Keep incision clean and dry. You may shower, but avoid baths. Please continue to use Nozin twice a day for 7 days after surgery. Ensure you attend your 1 week post op visit to have your dressing removed. Use antibacterial soap to wash your entire body until directed otherwise by a Physician, it is very important to shower daily. If you become concerned about the way your incision site looks, please contact your providers office.   Sexual Activity/Pelvic Rest: No sexual activity, tampons, or douching until your physician gives you consent.  Diet: Continue to eat from the five basic food groups, including plenty of protein, fruits, vegetables, and whole grains.  Limit empty calories and high fat foods.  Drink enough fluids to satisfy thirst and add an extra 500 calories for breastfeeding.  Constipation/Hemorrhoids: Drink plenty of water.  You may take a stool softener or natural laxative (Metamucil). You may use tucks or " hemorrhoid ointment and soak in a warm tub.    CALL YOUR OB DOCTOR IF ANY OF THE FOLLOWING OCCURS:  *Heavy bleeding - saturating a pad an hour or passing any large (2-3 inches in size) blood clots.  *Any pain, redness, or tenderness in lower leg.  *You cannot care for yourself or your baby.  *Any signs of infection-      - Temperature greater than 100.5 degrees F      - Foul smelling vaginal discharge and/or incisional drainage      - Increased episiotomy or incisional pain      - Hot, hard, red or sore area on breast      - Flu-like symptoms      - Any urgency, frequency or burning with urination    Return To the Hospital for further Evaluation:  Headache not relieved by tylenol or ibuprofen  Blurry vision, double vision, seeing spots, or flashing lights  Feeling faint or passing out  Right epigastric pain  Difficulty breathing  Swelling in hands, face, or feet  Any of these symptoms accompanied by nausea/vomiting  Gaining more than 5 pounds in one week  Seizures  These symptoms could be an indication of elevated blood pressure.     For patients that were treated for high blood pressure during pregnancy and or your hospital stay you will need a blood pressure check three days after you leave the hospital. Your nursing staff will assist you in an appointment if needed. If you have Connected Mom you may use your blood pressure cuff and report any readings 140/90 to your provider immediately.       If you have any questions that need to be answered immediately please call the Labor & Delivery Unit at [phone] and ask to speak to a nurse.      Please see [facility] folder for additional information and handouts.

## 2024-03-13 NOTE — LACTATION NOTE
Lactation rounds: Mother states that she is currently bottle feeding formula due to the absence of EBM. She does not plan to resume latching at this time. She states she will at some point after her milk comes in.    Mother states that she has pumped and hand expressed 3 times since starting formula. She denies being able to collect any milk at this time. Mother states she has no pain or discomfort with pumping or hand expressing.     Reviewed:  -mechanism of milk production and maintenance  -risks and implications of inadequate breast stimulation and milk removal  -frequency and duration of pumping for both initiating and maintaining full milk supply  -proper use of pump  -hands on pumping techniques  -hand expression after pumping  -cleaning of pump kit  -handling, collection, storage and transportation of EBM  -labeling of EBM    Instructed mother to call lactation warmline when she is ready to resume direct breastfeeding for further education at that time. Reviewed available outpatient lactation resources.      Mother anticipates discharge home today. Instructed mother to continue to pump breast for 15-20 minutes each session. Instructed mother to pump breast until there is no milk flowing for 2 minutes once she collects 20 mL EBM for 3 consecutive pumping sessions.    Mother states her breast pump from [employer] has shipped and she is awaiting it's arrival. She states she does have access to 2 other electric breast pumps as well. "[employer] Symphony Hand Pump provided with instructions of proper use.     Reviewed signs of engorgement and expectant management. Reviewed signs of mastitis and instructed mother to call OB provider and lactation if any signs present. Reviewed proper milk handling, collection and storage guidelines. Reviewed nursing diet and nutrition. Discussed resources for medication safety while breastfeeding.    Mother verbalizes understanding of all education and counseling; she denies any further  lactation needs or concerns at this time. Encouraged mother to contact lactation with any questions, concerns, or problems, contact number provided.

## 2024-03-13 NOTE — ASSESSMENT & PLAN NOTE
POD # 2 s/p PLTCS (FTP)  Pt is doing well and is ready and stable for discharge to home.  Pt was counseled on post-operative care and warning sign instructions.

## 2024-03-13 NOTE — PROGRESS NOTES
"[facility] ([facility])  Obstetrics  Postpartum Progress Note    Patient Name: Maninder Schneider  MRN: [id number]  Admission Date: 3/10/2024  Hospital Length of Stay: 3 days  Attending Physician: Soraya Bain MD  Primary Care Provider: Rico Little MD    Subjective:     Principal Problem:S/P primary low transverse     Hospital Course:  Admit to   [facility] 0420 3/10  Pt plans NCB, will recheck cervix for change in 2 hours  Augmentation as needed.     3/11/24 @ 0845:  Epidural in place  VE /3  Discussed prolong  ruptured, inadequate contractions, and FTP.  Patient desires to  continue attempts for vaginal delivery.   Afebrile   Continue  pitocin  Anticipate    2024--primary c/section for failure to progress, mother and infant transferred to [facility] for routine postpartum care      Interval History:     She is doing well this morning. She is tolerating a regular diet without nausea or vomiting. She is voiding spontaneously. She is ambulating. She has passed flatus, and has not a BM. Vaginal bleeding is mild. She denies fever or chills. Abdominal pain is moderate and controlled with oral medications. She Is breastfeeding. She desires circumcision for her male baby: yes.    Objective:     Vital Signs (Most Recent):  Temp: 97.8 °F (36.6 °C) (24 0821)  Pulse: 83 (24 08)  Resp: 14 (24 0821)  BP: (!) 98/51 (24 0821)  SpO2: 100 % (24 0515) Vital Signs (24h Range):  Temp:  [97.8 °F (36.6 °C)-98.1 °F (36.7 °C)] 97.8 °F (36.6 °C)  Pulse:  [] 83  Resp:  [14-18] 14  SpO2:  [100 %] 100 %  BP: ()/(51-66) 98/51     Weight: 111.6 kg (246 lb 0.5 oz)  Body mass index is 46.49 kg/m².    No intake or output data in the 24 hours ending 24 0903      Significant Labs:  Lab Results   Component Value Date    GROUPTRH A POS 03/10/2024    HEPBSAG Negative 2023    STREPBCULT No Group B Streptococcus isolated 2024     No results for input(s): "HGB", "HCT" in " the last 48 hours.    I have personallly reviewed all pertinent lab results from the last 24 hours.    Physical Exam:   Constitutional: She is oriented to person, place, and time. She appears well-developed and well-nourished. No distress.       Cardiovascular:  Normal rate, regular rhythm and normal heart sounds.             Pulmonary/Chest: Effort normal and breath sounds normal.        Abdominal: Soft. Bowel sounds are normal. She exhibits abdominal incision (dressing in place). She exhibits no distension and no mass. There is abdominal tenderness (appropriate). There is no rebound and no guarding. No hernia.     Genitourinary: There is bleeding in the vagina. Uterus is not tender.           Musculoskeletal: Normal range of motion and moves all extremeties. Edema present.       Neurological: She is alert and oriented to person, place, and time.    Skin: Skin is warm and dry.    Psychiatric: She has a normal mood and affect. Her behavior is normal. Thought content normal.       Review of Systems  Assessment/Plan:     29 y.o. female  for:    * S/P primary low transverse   POD # 2 s/p PLTCS (FTP)  Pt is doing well and is ready and stable for discharge to home.  Pt was counseled on post-operative care and warning sign instructions.    Obesity affecting pregnancy in third trimester  Lovenox PP        Disposition: As patient meets milestones, will plan to discharge today.    Fernando Ellison MD  Obstetrics  [facility] ([facility])

## 2024-03-19 ENCOUNTER — OFFICE VISIT (OUTPATIENT)
Dept: OBSTETRICS AND GYNECOLOGY | Facility: CLINIC | Age: 29
End: 2024-03-19
Payer: MEDICAID

## 2024-03-19 VITALS
DIASTOLIC BLOOD PRESSURE: 68 MMHG | SYSTOLIC BLOOD PRESSURE: 130 MMHG | BODY MASS INDEX: 45.75 KG/M2 | HEIGHT: 61 IN | WEIGHT: 242.31 LBS

## 2024-03-19 DIAGNOSIS — Z48.01 DRESSING CHANGE OR REMOVAL, SURGICAL WOUND: Primary | ICD-10-CM

## 2024-03-19 PROCEDURE — 3075F SYST BP GE 130 - 139MM HG: CPT | Mod: CPTII,,, | Performed by: NURSE PRACTITIONER

## 2024-03-19 PROCEDURE — 1159F MED LIST DOCD IN RCRD: CPT | Mod: CPTII,,, | Performed by: NURSE PRACTITIONER

## 2024-03-19 PROCEDURE — 1160F RVW MEDS BY RX/DR IN RCRD: CPT | Mod: CPTII,,, | Performed by: NURSE PRACTITIONER

## 2024-03-19 PROCEDURE — 3078F DIAST BP <80 MM HG: CPT | Mod: CPTII,,, | Performed by: NURSE PRACTITIONER

## 2024-03-19 PROCEDURE — 99213 OFFICE O/P EST LOW 20 MIN: CPT | Mod: PBBFAC,PO | Performed by: NURSE PRACTITIONER

## 2024-03-19 PROCEDURE — 99024 POSTOP FOLLOW-UP VISIT: CPT | Mod: ,,, | Performed by: NURSE PRACTITIONER

## 2024-03-19 PROCEDURE — 99999 PR PBB SHADOW E&M-EST. PATIENT-LVL III: CPT | Mod: PBBFAC,,, | Performed by: NURSE PRACTITIONER

## 2024-03-19 NOTE — PROGRESS NOTES
Subjective:       Patient ID: Maninder Schneider is a 29 y.o. female.    Chief Complaint:  dressing removal      History of Present Illness  HPI   present for dressing removal  Feels pain on right side when moving  Relieved with percocet and ibuprofen  Swelling to legs and feet  Denies HA, blurred vision, numbness/tingling  Doing well -- some blues, but able to care for self and baby  No bladder/bowel issues  Pumping and supplementing      GYN & OB History  Patient's last menstrual period was 2023 (approximate).   Date of Last Pap: 2023    OB History    Para Term  AB Living   1 1 1 0 0 1   SAB IAB Ectopic Multiple Live Births   0 0 0 0 1      # Outcome Date GA Lbr Parish/2nd Weight Sex Delivery Anes PTL Lv   1 Term 24 37w5d  2.82 kg (6 lb 3.5 oz) M CS-LTranv EPI N ISAAC      Complications: Failure to Progress in First Stage       Review of Systems  Review of Systems   Constitutional:  Negative for chills, fatigue and fever.   Gastrointestinal:  Positive for abdominal pain. Negative for constipation.   Genitourinary:  Negative for dysuria, frequency, hematuria and urgency.   Psychiatric/Behavioral:  Negative for depression. The patient is not nervous/anxious.            Objective:      Physical Exam:   Constitutional: She is oriented to person, place, and time. She appears well-developed and well-nourished. No distress.    HENT:   Head: Normocephalic and atraumatic.    Eyes: Pupils are equal, round, and reactive to light. Conjunctivae and EOM are normal.      Pulmonary/Chest: Effort normal.        Abdominal: Soft.                 Musculoskeletal: Normal range of motion and moves all extremeties.       Neurological: She is alert and oriented to person, place, and time.    Skin: Skin is warm and dry. No rash noted. She is not diaphoretic. No erythema. No pallor.    Psychiatric: She has a normal mood and affect. Her behavior is normal. Judgment and thought content normal.              Assessment:        1. Dressing change or removal, surgical wound               Plan:   Pre-e precautions given  Discussed pumping 8-12x/24h, balanced meals and hydration  Discussed hand/incision hygiene  Discussed blues -- monitor within the next week; RTC prn    Continue annual well woman exam.    Dressing change or removal, surgical wound

## 2024-03-27 ENCOUNTER — PATIENT MESSAGE (OUTPATIENT)
Dept: OBSTETRICS AND GYNECOLOGY | Facility: CLINIC | Age: 29
End: 2024-03-27
Payer: MEDICAID

## 2024-04-02 ENCOUNTER — OFFICE VISIT (OUTPATIENT)
Dept: OBSTETRICS AND GYNECOLOGY | Facility: CLINIC | Age: 29
End: 2024-04-02
Payer: MEDICAID

## 2024-04-02 VITALS
WEIGHT: 230.81 LBS | BODY MASS INDEX: 43.58 KG/M2 | DIASTOLIC BLOOD PRESSURE: 78 MMHG | HEIGHT: 61 IN | SYSTOLIC BLOOD PRESSURE: 122 MMHG

## 2024-04-02 DIAGNOSIS — Z98.891 S/P PRIMARY LOW TRANSVERSE C-SECTION: Primary | ICD-10-CM

## 2024-04-02 PROCEDURE — 1160F RVW MEDS BY RX/DR IN RCRD: CPT | Mod: CPTII,,, | Performed by: NURSE PRACTITIONER

## 2024-04-02 PROCEDURE — 99212 OFFICE O/P EST SF 10 MIN: CPT | Mod: PBBFAC,PO | Performed by: NURSE PRACTITIONER

## 2024-04-02 PROCEDURE — 3078F DIAST BP <80 MM HG: CPT | Mod: CPTII,,, | Performed by: NURSE PRACTITIONER

## 2024-04-02 PROCEDURE — 99024 POSTOP FOLLOW-UP VISIT: CPT | Mod: ,,, | Performed by: NURSE PRACTITIONER

## 2024-04-02 PROCEDURE — 99999 PR PBB SHADOW E&M-EST. PATIENT-LVL II: CPT | Mod: PBBFAC,,, | Performed by: NURSE PRACTITIONER

## 2024-04-02 PROCEDURE — 1159F MED LIST DOCD IN RCRD: CPT | Mod: CPTII,,, | Performed by: NURSE PRACTITIONER

## 2024-04-02 PROCEDURE — 3074F SYST BP LT 130 MM HG: CPT | Mod: CPTII,,, | Performed by: NURSE PRACTITIONER

## 2024-04-02 NOTE — PROGRESS NOTES
Subjective:       Patient ID: Maninder Schneider is a 29 y.o. female.    Chief Complaint:  Wound Check      History of Present Illness  HPI   present with her mother for incision check  Reports she had serosanguineous drainage over the weekend  Feels the area is red  Scared to really look at it  Has not seen any drainage since Saturday  Denies fever, fatigue, chills    Doing well; blues are resolving, but does have mood swings  Pumping with good milk supply  Decreased appetite.    GYN & OB History  Patient's last menstrual period was 2023 (approximate).   Date of Last Pap: 2023    OB History    Para Term  AB Living   1 1 1 0 0 1   SAB IAB Ectopic Multiple Live Births   0 0 0 0 1      # Outcome Date GA Lbr Parish/2nd Weight Sex Delivery Anes PTL Lv   1 Term 24 37w5d  2.82 kg (6 lb 3.5 oz) M CS-LTranv EPI N ISAAC      Complications: Failure to Progress in First Stage       Review of Systems  Review of Systems   Constitutional:  Positive for appetite change. Negative for chills, fatigue and fever.   Integumentary:         Incision drainage   Psychiatric/Behavioral:  Negative for depression.            Objective:      Physical Exam:   Constitutional: She is oriented to person, place, and time. She appears well-developed and well-nourished. No distress.    HENT:   Head: Normocephalic and atraumatic.    Eyes: Pupils are equal, round, and reactive to light. Conjunctivae and EOM are normal.      Pulmonary/Chest: Effort normal.        Abdominal: There is no abdominal tenderness.                 Musculoskeletal: Normal range of motion and moves all extremeties.       Neurological: She is alert and oriented to person, place, and time.    Skin: Skin is warm and dry. No rash noted. She is not diaphoretic. No erythema. No pallor.    Psychiatric: She has a normal mood and affect. Her behavior is normal. Judgment and thought content normal.             Assessment:        1. S/P primary low transverse                 Plan:   Continue to monitor site  Keep clean and dry  Area of concern that was red are stretch marks    Continue postpartum appt    S/P primary low transverse

## 2024-04-11 ENCOUNTER — POSTPARTUM VISIT (OUTPATIENT)
Dept: OBSTETRICS AND GYNECOLOGY | Facility: CLINIC | Age: 29
End: 2024-04-11
Payer: MEDICAID

## 2024-04-11 VITALS
DIASTOLIC BLOOD PRESSURE: 59 MMHG | HEIGHT: 61 IN | BODY MASS INDEX: 43.58 KG/M2 | WEIGHT: 230.81 LBS | SYSTOLIC BLOOD PRESSURE: 108 MMHG

## 2024-04-11 PROCEDURE — 99999 PR PBB SHADOW E&M-EST. PATIENT-LVL II: CPT | Mod: PBBFAC,,, | Performed by: ADVANCED PRACTICE MIDWIFE

## 2024-04-11 PROCEDURE — 99212 OFFICE O/P EST SF 10 MIN: CPT | Mod: PBBFAC,PO | Performed by: ADVANCED PRACTICE MIDWIFE

## 2024-04-11 NOTE — PROGRESS NOTES
"Subjective:       Maninder Schneider is a 29 y.o. female who presents for a postpartum visit. She is 4 weeks postpartum following a low cervical transverse  section. I have fully reviewed the prenatal and intrapartum course. The delivery was at 37.5 gestational weeks. Outcome: primary  section, low transverse incision. Anesthesia: epidural. Postpartum course has been uncomplicated. Baby's course has been uncomplicated. Baby is feeding by bottle (breast milk), Maninder is pumping. Bleeding no bleeding. Bowel function is normal. Bladder function is normal. Patient is not sexually active. Contraception method is none. Postpartum depression screening: negative. Partner has been supportive.     The following portions of the patient's history were reviewed and updated as appropriate: allergies, current medications, past family history, past medical history, past social history, past surgical history, and problem list.    Review of Systems  Pertinent items are noted in HPI.     Objective:      BP (!) 108/59   Ht 5' 1" (1.549 m)   Wt 104.7 kg (230 lb 13.2 oz)   LMP 2023 (Approximate)   Breastfeeding Yes Comment: pumping breast milk into bottle  BMI 43.61 kg/m²    General:  alert, appears stated age, and cooperative   Abdomen: soft, non-tender; bowel sounds normal; no masses,  no organomegaly and healing well approximated  incision          Assessment:      Routine postpartum exam. Pap smear not done at today's visit.     Plan:      1. Contraception:  none . Patient desires pregnancy. Discussed with patient the importance of waiting at least 1 year before conceiving.   2. Last pap 2024, next pap 2027  3. Follow up in: 1  year  or as needed.     DANGELO De León  "

## 2024-11-15 ENCOUNTER — PATIENT MESSAGE (OUTPATIENT)
Dept: OBSTETRICS AND GYNECOLOGY | Facility: CLINIC | Age: 29
End: 2024-11-15
Payer: MEDICAID

## (undated) DEVICE — TAPE CURAD PAPER 3INX10YD

## (undated) DEVICE — PAD ABD 8X10 STERILE

## (undated) DEVICE — TAPE SILK 3IN